# Patient Record
Sex: FEMALE | Race: WHITE | NOT HISPANIC OR LATINO | Employment: FULL TIME | URBAN - METROPOLITAN AREA
[De-identification: names, ages, dates, MRNs, and addresses within clinical notes are randomized per-mention and may not be internally consistent; named-entity substitution may affect disease eponyms.]

---

## 2021-01-06 ENCOUNTER — TELEPHONE (OUTPATIENT)
Dept: SCHEDULING | Facility: IMAGING CENTER | Age: 36
End: 2021-01-06

## 2021-02-03 ENCOUNTER — PATIENT OUTREACH (OUTPATIENT)
Dept: MEDICAL GROUP | Facility: MEDICAL CENTER | Age: 36
End: 2021-02-03

## 2021-02-03 RX ORDER — WARFARIN SODIUM 5 MG/1
5 TABLET ORAL DAILY
COMMUNITY
End: 2021-03-15

## 2021-02-03 NOTE — PROGRESS NOTES
NEW PATIENT VISIT PRE-VISIT PLANNING    1.  EpicCare Patient is checked in Patient Demographics?Yes    2.  Immunizations were updated in Epic using Reconcile Outside Information activity? Yes         3.  Is this appointment scheduled as a Hospital Follow-Up? No    4.  Patient is due for the following Health Maintenance Topics:   Health Maintenance Due   Topic Date Due   • IMM DTaP/Tdap/Td Vaccine (1 - Tdap) 03/31/2004   • PAP SMEAR  03/31/2006   • IMM INFLUENZA (1) 09/01/2020       5.  Reviewed/Updated the following with patient:       •   Preferred Pharmacy? Yes       •   Preferred Lab? Yes       •   Preferred Communication? Yes       •   Allergies? Yes       •   Medications? YES. Was Abstract Encounter opened and chart updated? YES       •   Social History? Yes       •   Family History (document living status of immediate family members and if + hx of  cancer, diabetes, hypertension, hyperlipidemia, heart attack, stroke) No    6.  Updated Care Team?       •   DME Company (gait device, O2, CPAP, etc.) N\A       •   Other Specialists (eye doctor, derm, GYN, cardiology, endo, etc): N\A    7.  AHA (Puls[employer]) form printed for Provider? N/A

## 2021-02-10 ENCOUNTER — TELEMEDICINE (OUTPATIENT)
Dept: MEDICAL GROUP | Facility: MEDICAL CENTER | Age: 36
End: 2021-02-10
Payer: COMMERCIAL

## 2021-02-10 VITALS — HEIGHT: 66 IN | WEIGHT: 200 LBS | BODY MASS INDEX: 32.14 KG/M2

## 2021-02-10 DIAGNOSIS — Z86.718 HISTORY OF DVT (DEEP VEIN THROMBOSIS): ICD-10-CM

## 2021-02-10 DIAGNOSIS — D68.51 HOMOZYGOUS FACTOR V LEIDEN MUTATION (HCC): ICD-10-CM

## 2021-02-10 DIAGNOSIS — G43.109 MIGRAINE WITH AURA AND WITHOUT STATUS MIGRAINOSUS, NOT INTRACTABLE: ICD-10-CM

## 2021-02-10 DIAGNOSIS — E66.9 OBESITY (BMI 30-39.9): ICD-10-CM

## 2021-02-10 DIAGNOSIS — R11.14 BILIOUS VOMITING WITH NAUSEA: ICD-10-CM

## 2021-02-10 DIAGNOSIS — Z79.01 WARFARIN ANTICOAGULATION: ICD-10-CM

## 2021-02-10 PROBLEM — D68.2 FACTOR V DEFICIENCY (HCC): Status: RESOLVED | Noted: 2021-02-10 | Resolved: 2021-02-10

## 2021-02-10 PROBLEM — D68.2 FACTOR V DEFICIENCY (HCC): Status: ACTIVE | Noted: 2021-02-10

## 2021-02-10 PROCEDURE — 99204 OFFICE O/P NEW MOD 45 MIN: CPT | Performed by: NURSE PRACTITIONER

## 2021-02-10 RX ORDER — COPPER 313.4 MG/1
INTRAUTERINE DEVICE INTRAUTERINE
COMMUNITY
End: 2021-02-10

## 2021-02-10 RX ORDER — SUMATRIPTAN 50 MG/1
50 TABLET, FILM COATED ORAL
Qty: 10 TABLET | Refills: 3 | Status: SHIPPED | OUTPATIENT
Start: 2021-02-10 | End: 2021-04-19 | Stop reason: SDUPTHER

## 2021-02-10 RX ORDER — COPPER 313.4 MG/1
INTRAUTERINE DEVICE INTRAUTERINE
COMMUNITY
Start: 2015-07-14 | End: 2025-07-15

## 2021-02-10 RX ORDER — ONDANSETRON 4 MG/1
4 TABLET, FILM COATED ORAL EVERY 4 HOURS PRN
Qty: 15 TABLET | Refills: 3 | Status: SHIPPED
Start: 2021-02-10 | End: 2022-01-18

## 2021-02-10 ASSESSMENT — PATIENT HEALTH QUESTIONNAIRE - PHQ9: CLINICAL INTERPRETATION OF PHQ2 SCORE: 0

## 2021-02-10 NOTE — ASSESSMENT & PLAN NOTE
Diagnosed . DVT x 2. Now on lifelong warfarin.     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    Second DVT LLE in  after  despite being on full dose lovenox during and after pregnancy. DVT was extensive, extending into IVC and iliac veins. 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 Contributing factors thought to be C section, lower dose Lovenox after Csection, and hyperemesis during pregnancy requiring PICC line and frequent IV rehydration.     She has been stable on Warfarin 5 mg daily. Not yet established with anticoagulation clinic.     Copper IUD in place for contraception.

## 2021-02-10 NOTE — ASSESSMENT & PLAN NOTE
Chronic. Ongoing for years. Getting migraines about twice per month. Not taking anything for this as oral medication make her nauseated. She does get nausea and vomiting with this every few migraines. She would like to try something prescription strength for this.     Symptoms include center vision becoming fuzzy/blurry about 30-45 mins prior to headache. Also nausea, photophobia, phonophobia. Migraines usually last a few hours up to an entire day.     Usually occurs about the week prior to period.

## 2021-02-10 NOTE — PROGRESS NOTES
Telemedicine Visit: Established Patient     This encounter was conducted via Zoom.   Verbal consent was obtained. Patient's identity was verified.    Subjective:   CC: Establish care and to discuss the following  Luci Mata is a 35 y.o. female presenting for evaluation and management of:    Migraine with aura and without status migrainosus, not intractable  Chronic. Ongoing for years. Getting migraines about twice per month. Not taking anything for this as oral medication make her nauseated. She does get nausea and vomiting with this every few migraines. She would like to try something prescription strength for this.     Symptoms include center vision becoming fuzzy/blurry about 30-45 mins prior to headache. Also nausea, photophobia, phonophobia. Migraines usually last a few hours up to an entire day.     Usually occurs about the week prior to period.     Hx of deep venous thrombosis  Severe, multiple. Currently on warfarin.     Homozygous Factor V Leiden mutation (HCC)  Diagnosed . DVT x 2. Now on lifelong warfarin.     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    Second DVT LLE in  after  despite being on full dose lovenox during and after pregnancy. DVT was extensive, extending into IVC and iliac veins. 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 Contributing factors thought to be C section, lower dose Lovenox after Csection, and hyperemesis during pregnancy requiring PICC line and frequent IV rehydration.     She has been stable on Warfarin 5 mg daily. Not yet established with anticoagulation clinic.     Copper IUD in place for contraception.          ROS   Denies any recent fevers or chills. No nausea or vomiting. No chest pains or shortness of breath.     No Known Allergies    Current medicines (including changes today)  Current Outpatient Medications   Medication Sig Dispense Refill   •  "Paragard Intrauterine Copper IUD by Intrauterine route.     • SUMAtriptan (IMITREX) 50 MG Tab Take 1 tablet by mouth one time as needed for Migraine for up to 1 dose. 10 tablet 3   • warfarin (COUMADIN) 5 MG Tab Take 5 mg by mouth every day. Pt. Takes half a pill a day       No current facility-administered medications for this visit.       Patient Active Problem List    Diagnosis Date Noted   • Migraine with aura and without status migrainosus, not intractable 02/10/2021   • Warfarin anticoagulation 02/10/2021   • Presence of IUD 07/14/2015   • Hx of deep venous thrombosis 05/17/2013   • Vitamin D deficiency 09/22/2011   • Homozygous Factor V Leiden mutation (HCC) 03/08/2009       No family history on file.    She  has no past medical history on file.  She  has no past surgical history on file.       Objective:   Ht 1.676 m (5' 6\")   Wt 90.7 kg (200 lb)   BMI 32.28 kg/m²     Physical Exam:  Constitutional: Alert, no distress, well-groomed.  Skin: No rashes in visible areas.  Eye: Round. Conjunctiva clear, lids normal. No icterus.   ENMT: Lips pink without lesions, good dentition, moist mucous membranes. Phonation normal.  Neck: No masses, no thyromegaly. Moves freely without pain.  CV: Pulse as reported by patient  Respiratory: Unlabored respiratory effort, no cough or audible wheeze  Psych: Alert and oriented x3, normal affect and mood.       Assessment and Plan:   The following treatment plan was discussed:     1. Migraine with aura and without status migrainosus, not intractable  Unstable  Trial sumatriptan 50 mg as needed  Zofran PRN nausea/vomiting  - SUMAtriptan (IMITREX) 50 MG Tab; Take 1 tablet by mouth one time as needed for Migraine for up to 1 dose.  Dispense: 10 tablet; Refill: 3  - ondansetron (ZOFRAN) 4 MG Tab tablet; Take 1 tablet by mouth every four hours as needed for Nausea/Vomiting.  Dispense: 15 tablet; Refill: 3    2. Homozygous Factor V Leiden mutation (HCC)  Stable  Continue warfarin " daily  Establish with anticoagulation clinic  - REFERRAL TO VASCULAR MEDICINE    3. History of DVT (deep vein thrombosis)  - REFERRAL TO VASCULAR MEDICINE    4. Warfarin anticoagulation  - REFERRAL TO VASCULAR MEDICINE    5. Bilious vomiting with nausea  - ondansetron (ZOFRAN) 4 MG Tab tablet; Take 1 tablet by mouth every four hours as needed for Nausea/Vomiting.  Dispense: 15 tablet; Refill: 3    Other orders  - Paragard Intrauterine Copper IUD; by Intrauterine route.        Follow-up: Return in about 4 weeks (around 3/10/2021) for migraines.

## 2021-02-23 ENCOUNTER — TELEPHONE (OUTPATIENT)
Dept: VASCULAR LAB | Facility: MEDICAL CENTER | Age: 36
End: 2021-02-23

## 2021-03-15 ENCOUNTER — TELEPHONE (OUTPATIENT)
Dept: VASCULAR LAB | Facility: MEDICAL CENTER | Age: 36
End: 2021-03-15

## 2021-03-15 ENCOUNTER — ANTICOAGULATION VISIT (OUTPATIENT)
Dept: MEDICAL GROUP | Facility: MEDICAL CENTER | Age: 36
End: 2021-03-15
Payer: COMMERCIAL

## 2021-03-15 DIAGNOSIS — Z86.718 HX OF DEEP VENOUS THROMBOSIS: ICD-10-CM

## 2021-03-15 DIAGNOSIS — D68.51 HOMOZYGOUS FACTOR V LEIDEN MUTATION (HCC): ICD-10-CM

## 2021-03-15 DIAGNOSIS — Z79.01 WARFARIN ANTICOAGULATION: ICD-10-CM

## 2021-03-15 LAB — INR PPP: 1.2 (ref 2–3.5)

## 2021-03-15 PROCEDURE — 99211 OFF/OP EST MAY X REQ PHY/QHP: CPT | Performed by: INTERNAL MEDICINE

## 2021-03-15 PROCEDURE — 85610 PROTHROMBIN TIME: CPT | Performed by: INTERNAL MEDICINE

## 2021-03-15 RX ORDER — WARFARIN SODIUM 5 MG/1
2.5-5 TABLET ORAL DAILY
Qty: 90 TABLET | Refills: 1 | Status: SHIPPED
Start: 2021-03-15 | End: 2022-01-20

## 2021-03-15 NOTE — PROGRESS NOTES
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 She was previously managed by [facility] in [place] ([phone]). She states her INR goal is 3.0-4.0 because she developed a DVT when her INR was 2.0. However, per external data on her chart, [facility] lists her INR goal to be 2.0-3.0. Pt is unable to recall who told her what her INR goal is. After this visit, I called the Anticoagulation Clinic at [facility] and the pharmacist confirmed her goal was 2.0-3.0.     CHADSVASC = n/a  HAS-BLED = 0  DOAC = will discuss with pt - however pt admits to forgetting to take her pills, which may be problematic if she was switched to a DOAC    Target end date = Indefinite    Pt is new to warfarin and new to [facility]. Discussed:   · Indication for warfarin therapy and INR goal range.   · Importance of monitoring and compliance.   · Monitoring parameters, signs and symptoms of bleeding or clotting.  · Warfarin therapy, side effects, potential DDIs, OTC medications  · Hormonal therapy  none- has a copper IUD  · Pregnancy - copper IUD  · Antiplatelet none  · DDI n/a  · Importance of diet consistency, ie vitamin K intake, supplements  · Lifestyle safety, ie smoking, ETOH, hobby safety, fall safety/prevention  · Procedures for missed doses or suspected missed doses, surgeries/procedures, travel, dental work, any medication changes    Pt denies any unusual s/s of bleeding, bruising, clotting or any changes to diet or medications.    INR is subtherapeutic today.   Will continue to titrate pt's warfarin dose. Pt's been taking 5 mg on Monday, 2.5 mg AOD     Will have pt bolus with 10 mg tonight and tomorrow, then continue with regimen. Pt to start bridging with Lovenox    Recheck INR in 3 days.    Riana Berkowitz, PharmD    Added [facility] to care team   Send to [facility] to submit HM application

## 2021-03-15 NOTE — Clinical Note
"Arnold Jones, I saw this pt today during her anticoagulation appt and she was unable to  her sumatriptan because the pharmacy had an issue with the sig since it says for \"one dose\"."

## 2021-03-16 NOTE — TELEPHONE ENCOUNTER
Initial anticoagulation clinic note and PCP note reviewed  Pending further recommendations from PCP, we will continue with indefinite anticoagulation for history of VTE with reported homozygous factor V Leiden as recommended by PCP    Michael J. Bloch, MD  Anticoagulation Center    CC: Karen albright

## 2021-03-18 ENCOUNTER — TELEPHONE (OUTPATIENT)
Dept: VASCULAR LAB | Facility: MEDICAL CENTER | Age: 36
End: 2021-03-18

## 2021-03-18 ENCOUNTER — ANTICOAGULATION VISIT (OUTPATIENT)
Dept: MEDICAL GROUP | Facility: MEDICAL CENTER | Age: 36
End: 2021-03-18
Payer: COMMERCIAL

## 2021-03-18 DIAGNOSIS — Z86.718 HX OF DEEP VENOUS THROMBOSIS: ICD-10-CM

## 2021-03-18 DIAGNOSIS — Z79.01 WARFARIN ANTICOAGULATION: ICD-10-CM

## 2021-03-18 DIAGNOSIS — D68.51 HOMOZYGOUS FACTOR V LEIDEN MUTATION (HCC): ICD-10-CM

## 2021-03-18 LAB — INR PPP: 4.3 (ref 2–3.5)

## 2021-03-18 PROCEDURE — 85610 PROTHROMBIN TIME: CPT | Performed by: INTERNAL MEDICINE

## 2021-03-18 PROCEDURE — 99211 OFF/OP EST MAY X REQ PHY/QHP: CPT | Performed by: INTERNAL MEDICINE

## 2021-03-18 NOTE — PROGRESS NOTES
OP Anticoagulation Service Note    Date: 3/18/2021  There were no vitals filed for this visit.   pt declined vitals    Anticoagulation Summary  As of 3/18/2021    INR goal:  2.0-3.0   TTR:  --   INR used for dosin.30 (3/18/2021)   Warfarin maintenance plan:  5 mg (5 mg x 1) every Mon; 2.5 mg (5 mg x 0.5) all other days   Weekly warfarin total:  20 mg   Plan last modified:  Riana Berkowitz, PharmD (3/15/2021)   Next INR check:  2021   Target end date:  Indefinite    Indications    Homozygous Factor V Leiden mutation (HCC) [D68.51]  Hx of deep venous thrombosis [Z86.718]  Factor V deficiency (HCC) (Resolved) [D68.2]  Warfarin anticoagulation [Z79.01]             Anticoagulation Episode Summary     INR check location:      Preferred lab:      Send INR reminders to:      Comments:        Anticoagulation Care Providers     Provider Role Specialty Phone number    [facility] Referring  [phone]        Anticoagulation Patient Findings  Patient Findings     Negatives:  Signs/symptoms of thrombosis, Signs/symptoms of bleeding, Laboratory test error suspected, Change in health, Change in alcohol use, Change in activity, Upcoming invasive procedure, Emergency department visit, Upcoming dental procedure, Missed doses, Extra doses, Change in medications, Change in diet/appetite, Hospital admission, Bruising, Other complaints          HPI:   Luci Mata seen in clinic today, on anticoagulation therapy with warfarin (a high risk medication) for hx of DVT, homozygous factor V leiden mutation    Pt is here today to evaluate anticoagulation therapy    Previous INR was  1.2 on 3/15/2021    Pt was instructed to bolus x 2 days, then continue regimen and begin bridging with Lovenox    Confirmed warfarin dosing regimen, denies missed or extra doses of coumadin.   Diet has been consistent with foods rich in vitamin K: Yes  Changes in ETOH:  No  Changes in smoking status: No  Changes in medication:  No   Cost restriction: No  S/s of bleeding:  No  Falls or accidents since last visit No  Signs/symptoms  thrombosis since the last appt: No      A/P   INR  supra-therapeutic today, will require dose adjust ment today to prevent bleeding complications and closer follow up.     Hold x 1 day then continue regimen    03/22 check referral    Pt educated to contact our clinic with any changes in medications or s/s of bleeding or thrombosis. Pt is aware to seek immediate medical attention for falls, head injury or deep cuts    Follow up appointment in 2 week(s) at [facility] as pt will be out of town all next week  Riana Berkowitz, RamónD

## 2021-03-22 ENCOUNTER — TELEPHONE (OUTPATIENT)
Dept: VASCULAR LAB | Facility: MEDICAL CENTER | Age: 36
End: 2021-03-22

## 2021-04-01 ENCOUNTER — ANTICOAGULATION VISIT (OUTPATIENT)
Dept: MEDICAL GROUP | Facility: PHYSICIAN GROUP | Age: 36
End: 2021-04-01
Payer: COMMERCIAL

## 2021-04-01 DIAGNOSIS — D68.51 HOMOZYGOUS FACTOR V LEIDEN MUTATION (HCC): ICD-10-CM

## 2021-04-01 DIAGNOSIS — Z86.718 HX OF DEEP VENOUS THROMBOSIS: ICD-10-CM

## 2021-04-01 DIAGNOSIS — Z79.01 WARFARIN ANTICOAGULATION: ICD-10-CM

## 2021-04-01 LAB — INR PPP: 2.8 (ref 2–3.5)

## 2021-04-01 PROCEDURE — 93793 ANTICOAG MGMT PT WARFARIN: CPT | Performed by: NURSE PRACTITIONER

## 2021-04-01 PROCEDURE — 85610 PROTHROMBIN TIME: CPT | Performed by: NURSE PRACTITIONER

## 2021-04-01 NOTE — PROGRESS NOTES
OP Anticoagulation Service Note    Date: 2021    Anticoagulation Summary  As of 2021    INR goal:  2.0-3.0   TTR:  24.4 % (1 wk)   INR used for dosin.80 (2021)   Warfarin maintenance plan:  5 mg (5 mg x 1) every Mon; 2.5 mg (5 mg x 0.5) all other days   Weekly warfarin total:  20 mg   Plan last modified:  Riana Berkowitz, PharmD (3/15/2021)   Next INR check:  2021   Target end date:  Indefinite    Indications    Homozygous Factor V Leiden mutation (HCC) [D68.51]  Hx of deep venous thrombosis [Z86.718]  Factor V deficiency (HCC) (Resolved) [D68.2]  Warfarin anticoagulation [Z79.01]             Anticoagulation Episode Summary     INR check location:      Preferred lab:      Send INR reminders to:      Comments:        Anticoagulation Care Providers     Provider Role Specialty Phone number    [facility] Referring  [phone]        Anticoagulation Patient Findings      HPI:     Luci Mata is in the Anticoagulation Clinic today for a INR check on their anticoagulation therapy.     The reason for today's visit is to prevent morbidity and mortality from a blood clot and/or stroke and to reduce the risk of bleeding while on a anticoagulant.     PCP:  Karen Fajardo, GIOVANNIPDandreR.N.  [address]  [facility] [id number] [phone]    3 vitals included with today's appt-unless patient declined:  (BP, HR, weight, ht, RR)   There were no vitals filed for this visit.    Assessment:     • INR  therapeutic.   • Confirmed warfarin dosing regimen: Yes  • Interval Changes with foods rich in vitamin K: No  • Interval Changes in ETOH or cranberries:   No  • Interval Changes in smoking status:  No  • S/S of bleeding or bruising:  No  • Signs/symptoms  thrombosis since the last appt:  No  • Any upcoming procedures that require stopping warfarin and/or using bridge therapy: None  • Interval Changes in medication:  No   • Is pt on antiplatelet therapy: no.  • Is pt on NSAID:  no      Current Outpatient Medications:   •  warfarin, 2.5-5 mg, Oral, DAILY  •  Paragard Intrauterine Copper, by Intrauterine route.  •  SUMAtriptan, 50 mg, Oral, Once PRN  •  ondansetron, 4 mg, Oral, Q4HRS PRN      Plan:     Continue the same warfarin dose, as noted above.     Follow-up:     • Our protocol suggests we test in 4 weeks.    • This decision was made using shared decision making with the pt and benefits vs risks were discussed to reduce Covid exposure.        Additional information discussed with patient:     • Pt educated to contact our clinic with any changes in medications or s/s of bleeding or thrombosis.  • Education was provided today regarding tips to reduce their bleed risk and dietary constraints while on a anticoagulant.    National recommendations regarding anticoagulation therapy:     The CHEST guidelines recommends frequent INR monitoring at regular intervals (a few days up to a max of 12 weeks) to ensure patients are on the proper dose of warfarin, and patients are not having any complications from therapy.  INRs can dramatically change over a short time period due to diet, medications, and medical conditions.       Walter Roberson, PharmD, MS, BCACP, LCC  [facility]  Phone [phone] fax [phone]    This note was created using voice recognition software (Dragon). The accuracy of the dictation is limited by the abilities of the software. I have reviewed the note prior to signing, however some errors in grammar and context are still possible. If you have any questions related to this note please do not hesitate to contact our office.

## 2021-04-19 ENCOUNTER — PATIENT MESSAGE (OUTPATIENT)
Dept: MEDICAL GROUP | Facility: MEDICAL CENTER | Age: 36
End: 2021-04-19

## 2021-04-19 DIAGNOSIS — G43.109 MIGRAINE WITH AURA AND WITHOUT STATUS MIGRAINOSUS, NOT INTRACTABLE: ICD-10-CM

## 2021-04-19 RX ORDER — SUMATRIPTAN 50 MG/1
50 TABLET, FILM COATED ORAL
Qty: 10 TABLET | Refills: 3 | Status: SHIPPED | OUTPATIENT
Start: 2021-04-19 | End: 2021-04-21 | Stop reason: SDUPTHER

## 2021-04-21 RX ORDER — SUMATRIPTAN 50 MG/1
50 TABLET, FILM COATED ORAL
Qty: 10 TABLET | Refills: 3 | Status: SHIPPED
Start: 2021-04-21 | End: 2022-01-18

## 2021-04-21 NOTE — TELEPHONE ENCOUNTER
1. Caller Name: [facility]  Call Back Number: [phone]    Pharmacy sent fax requesting max daily dose of medication. Called pharmacy and provided max daily dose: 100 MG per [place].

## 2021-04-25 LAB — INR PPP: 1.1 (ref 2–3.5)

## 2021-04-26 ENCOUNTER — ANTICOAGULATION MONITORING (OUTPATIENT)
Dept: VASCULAR LAB | Facility: MEDICAL CENTER | Age: 36
End: 2021-04-26

## 2021-04-26 DIAGNOSIS — Z79.01 WARFARIN ANTICOAGULATION: ICD-10-CM

## 2021-04-26 DIAGNOSIS — D68.51 HOMOZYGOUS FACTOR V LEIDEN MUTATION (HCC): ICD-10-CM

## 2021-04-26 DIAGNOSIS — Z86.718 HX OF DEEP VENOUS THROMBOSIS: ICD-10-CM

## 2021-04-26 NOTE — PROGRESS NOTES
I attempted to call this patient to report their INR. Unfortunately, I was not able to speak with them or leave a voice message; therefore, we will need to call back at a later time.    INR 1.1

## 2021-04-27 NOTE — PROGRESS NOTES
Anticoagulation Summary  As of 2021    INR goal:  2.0-3.0   TTR:  41.5 % (1 mo)   INR used for dosin.10 (2021)   Warfarin maintenance plan:  5 mg (5 mg x 1) every Mon; 2.5 mg (5 mg x 0.5) all other days   Weekly warfarin total:  20 mg   Plan last modified:  Riana Berkowitz PharmD (3/15/2021)   Next INR check:  2021   Target end date:  Indefinite    Indications    Homozygous Factor V Leiden mutation (HCC) [D68.51]  Hx of deep venous thrombosis [Z86.718]  Factor V deficiency (HCC) (Resolved) [D68.2]  Warfarin anticoagulation [Z79.01]             Anticoagulation Episode Summary     INR check location:      Preferred lab:      Send INR reminders to:      Comments:        Anticoagulation Care Providers     Provider Role Specialty Phone number    [facility] Referring  [phone]        Anticoagulation Patient Findings      Spoke with pt.  INR is subtherapeutic.   Pt denies any unusual s/s of bleeding, bruising, clotting or any changes to diet or medications. Denies any etoh, cranberries, supplements, or illness.   Pt verifies warfarin weekly dosing. Pt states she missed doses.    Will have pt continue regimen. Pt states she took 5 mg bolus x 1 day.    Repeat INR in 1 week(s).     Riana Berkowitz, PharmD

## 2021-05-05 ENCOUNTER — TELEPHONE (OUTPATIENT)
Dept: VASCULAR LAB | Facility: MEDICAL CENTER | Age: 36
End: 2021-05-05

## 2021-05-05 NOTE — TELEPHONE ENCOUNTER
S/w to remind of need for INR check.  She states she will test when she get back home tomorrow, 5/6/21.  Brando Pruitt, PharmD, BCACP

## 2021-05-10 ENCOUNTER — TELEPHONE (OUTPATIENT)
Dept: VASCULAR LAB | Facility: MEDICAL CENTER | Age: 36
End: 2021-05-10

## 2021-05-12 ENCOUNTER — ANTICOAGULATION MONITORING (OUTPATIENT)
Dept: VASCULAR LAB | Facility: MEDICAL CENTER | Age: 36
End: 2021-05-12

## 2021-05-12 DIAGNOSIS — Z79.01 WARFARIN ANTICOAGULATION: ICD-10-CM

## 2021-05-12 DIAGNOSIS — D68.51 HOMOZYGOUS FACTOR V LEIDEN MUTATION (HCC): ICD-10-CM

## 2021-05-12 DIAGNOSIS — Z86.718 HX OF DEEP VENOUS THROMBOSIS: ICD-10-CM

## 2021-05-12 LAB — INR PPP: 1.9 (ref 2–3.5)

## 2021-05-12 NOTE — Clinical Note
Arnold Hassan! [facility] is asking pt to test every week (4x/month) but we only need her to test every 2 weeks (2x/month). Can you change that on her application and have one of the providers sign the change in testing frequency?   EARL Mayers

## 2021-05-12 NOTE — PROGRESS NOTES
"Anticoagulation Summary  As of 2021    INR goal:  2.0-3.0   TTR:  26.8 % (1.6 mo)   INR used for dosin.90 (2021)   Warfarin maintenance plan:  5 mg (5 mg x 1) every Mon; 2.5 mg (5 mg x 0.5) all other days   Weekly warfarin total:  20 mg   Plan last modified:  Riana Berkowitz PharmD (3/15/2021)   Next INR check:  2021   Target end date:  Indefinite    Indications    Homozygous Factor V Leiden mutation (HCC) [D68.51]  Hx of deep venous thrombosis [Z86.718]  Factor V deficiency (HCC) (Resolved) [D68.2]  Warfarin anticoagulation [Z79.01]             Anticoagulation Episode Summary     INR check location:      Preferred lab:      Send INR reminders to:      Comments:  [facility] home meter      Anticoagulation Care Providers     Provider Role Specialty Phone number    [facility] Referring  [phone]        Anticoagulation Patient Findings      Spoke with pt.  INR is slightly subtherapeutic.   Pt denies any unusual s/s of bleeding, bruising, clotting or any changes to diet or medications. Denies any etoh, cranberries, supplements, or illness.   Pt verifies warfarin weekly dosing. Pt missed a dose.    Will have pt bolus x 1 day then continue regimen    Repeat INR in 2 week(s).   Of note pt informed me that [facility] wanted her to test INR every week when pt only needs to test every 2 weeks. Will ask Sonya to change testing frequency to 2x/month.    Riana Berkowitz, Adrienne    ADDENDUM : Per Sonya: All patients with [employer] [employer] require the home monitor application to say \"weekly\" test frequency or else the insurance company won't cover the monitor expenses. Pt will need to follow up with insurance. [facility] won't accept a 2-4 times a month test frequency per Aleksandar.\" - LVM for pt regarding update.      "

## 2021-05-28 LAB — INR PPP: 2.8 (ref 2–3.5)

## 2021-06-01 ENCOUNTER — ANTICOAGULATION MONITORING (OUTPATIENT)
Dept: VASCULAR LAB | Facility: MEDICAL CENTER | Age: 36
End: 2021-06-01

## 2021-06-01 DIAGNOSIS — Z79.01 WARFARIN ANTICOAGULATION: ICD-10-CM

## 2021-06-01 DIAGNOSIS — Z86.718 HX OF DEEP VENOUS THROMBOSIS: ICD-10-CM

## 2021-06-01 DIAGNOSIS — D68.51 HOMOZYGOUS FACTOR V LEIDEN MUTATION (HCC): ICD-10-CM

## 2021-06-01 NOTE — PROGRESS NOTES
Anticoagulation Summary  As of 2021    INR goal:  2.0-3.0   TTR:  42.3 % (2.1 mo)   INR used for dosin.80 (2021)   Warfarin maintenance plan:  5 mg (5 mg x 1) every Mon; 2.5 mg (5 mg x 0.5) all other days   Weekly warfarin total:  20 mg   Plan last modified:  Riana Berkowitz, PharmD (3/15/2021)   Next INR check:     Target end date:  Indefinite    Indications    Homozygous Factor V Leiden mutation (HCC) [D68.51]  Hx of deep venous thrombosis [Z86.718]  Factor V deficiency (HCC) (Resolved) [D68.2]  Warfarin anticoagulation [Z79.01]             Anticoagulation Episode Summary     INR check location:      Preferred lab:      Send INR reminders to:      Comments:  [employer] home meter - needs to test weekly per insurance      Anticoagulation Care Providers     Provider Role Specialty Phone number    [facility] Referring  [phone]        Anticoagulation Patient Findings       Left voicemail message to report a  therapeutic INR of 2.8.  Pt to continue with current warfarin dosing regimen. Requested pt contact the clinic for any s/s of unusual bleeding, bruising, clotting or any changes to diet or medication.  Follow up in 1 weeks, to reduce the risk of adverse events related to this high risk medication, warfarin.    Nan Lizarraga, Clinical Pharmacist

## 2021-06-30 ENCOUNTER — TELEPHONE (OUTPATIENT)
Dept: VASCULAR LAB | Facility: MEDICAL CENTER | Age: 36
End: 2021-06-30

## 2021-08-17 ENCOUNTER — NON-PROVIDER VISIT (OUTPATIENT)
Dept: MEDICAL GROUP | Facility: PHYSICIAN GROUP | Age: 36
End: 2021-08-17

## 2021-08-17 DIAGNOSIS — Z11.1 ENCOUNTER FOR PPD TEST: ICD-10-CM

## 2021-08-17 PROCEDURE — 86580 TB INTRADERMAL TEST: CPT | Performed by: NURSE PRACTITIONER

## 2021-08-17 NOTE — PROGRESS NOTES
Luci Mata is a 36 y.o. female here for a non-provider visit for PPD placement -- Step 1 of 1    Reason for PPD:  work requirement    1. TB evaluation questionnaire completed by patient? Yes      -  If any answers marked yes did you contact a provider prior to placing? Not Indicated  2.  Patient notified to return to clinic for reading on: 08/19 after or 08/20 before   3.  PPD Placement documentation completed on TB evaluation questionnaire? Yes  4.  Location of TB evaluation questionnaire filed: [facility]

## 2021-08-18 ENCOUNTER — TELEMEDICINE (OUTPATIENT)
Dept: MEDICAL GROUP | Facility: MEDICAL CENTER | Age: 36
End: 2021-08-18
Payer: COMMERCIAL

## 2021-08-18 VITALS — WEIGHT: 200 LBS | HEIGHT: 66 IN | BODY MASS INDEX: 32.14 KG/M2

## 2021-08-18 DIAGNOSIS — R20.0 THIGH NUMBNESS: ICD-10-CM

## 2021-08-18 DIAGNOSIS — M54.32 SCIATICA OF LEFT SIDE: ICD-10-CM

## 2021-08-18 PROCEDURE — 99213 OFFICE O/P EST LOW 20 MIN: CPT | Mod: 95 | Performed by: NURSE PRACTITIONER

## 2021-08-18 NOTE — PROGRESS NOTES
Telemedicine Visit: Established Patient     This encounter was conducted via Zoom.   Verbal consent was obtained. Patient's identity was verified.    Subjective:   CC: thigh numbness  Luci Mata is a 36 y.o. female presenting for evaluation and management of:    Thigh numbness  Ongoing for 10+ years since the birth of her last child. Has been worsening lately with running regimen. Starts at knee and extends up 2/3 length of thigh, about 6 inches in diameter.     Described as numbness all the time, but when she is running she experiences painful burning and occasional tingling.     Does have some low back pain, reports slipping a disc about 4 years. Did PT for this for about 4 months. Also was taking anti-inflammatories for this.     Burning pain will last about 10 minutes. Numbness never goes away.        ROS   Positive ROS as per HPI. All other systems reviewed and are negative.    No Known Allergies    Current medicines (including changes today)  Current Outpatient Medications   Medication Sig Dispense Refill   • ondansetron (ZOFRAN) 4 MG Tab tablet Take 1 tablet by mouth every four hours as needed for Nausea/Vomiting. 15 tablet 3   • SUMAtriptan (IMITREX) 50 MG Tab Take 1 tablet by mouth 1 time a day as needed for Migraine (May repeat dose ONCE after 2 hours if migraine persists). 10 tablet 3   • warfarin (COUMADIN) 5 MG Tab Take 0.5-1 Tablets by mouth every day. As directed by [facility] 90 tablet 1   • Paragard Intrauterine Copper IUD by Intrauterine route.       No current facility-administered medications for this visit.       Patient Active Problem List    Diagnosis Date Noted   • Thigh numbness 08/18/2021   • Migraine with aura and without status migrainosus, not intractable 02/10/2021   • Warfarin anticoagulation 02/10/2021   • Obesity (BMI 30-39.9) 02/10/2021   • Presence of IUD 07/14/2015   • Hx of deep venous thrombosis 05/17/2013   • Vitamin D deficiency 09/22/2011   •  "Homozygous Factor V Leiden mutation (HCC) 03/08/2009       History reviewed. No pertinent family history.    She  has no past medical history on file.  She  has no past surgical history on file.       Objective:   Ht 1.676 m (5' 6\")   Wt 90.7 kg (200 lb)   LMP 08/17/2021 (Exact Date)   BMI 32.28 kg/m²     Physical Exam:  Constitutional: Alert, no distress, well-groomed.  Skin: No rashes in visible areas.  Eye: Round. Conjunctiva clear, lids normal. No icterus.   ENMT: Lips pink without lesions, good dentition, moist mucous membranes. Phonation normal.  Neck: No masses, no thyromegaly. Moves freely without pain.  CV: Pulse as reported by patient  Respiratory: Unlabored respiratory effort, no cough or audible wheeze  Psych: Alert and oriented x3, normal affect and mood.       Assessment and Plan:   The following treatment plan was discussed:     1. Thigh numbness  Unstable  Check lumbar Xray, last done 2013  Likely sciatica after pregnancy being exacerbated by running, advised no running, switch to brisk walk and cycling to decrease low back impact.   No Cauda Equina symptoms  Follow up with physiatry to discuss symptoms, consider EMG due to chronic numbness,   - DX-LUMBAR SPINE-2 OR 3 VIEWS; Future  - REFERRAL TO OUTPATIENT INTERVENTIONAL PAIN CLINIC    2. Sciatica of left side  - DX-LUMBAR SPINE-2 OR 3 VIEWS; Future  - REFERRAL TO OUTPATIENT INTERVENTIONAL PAIN CLINIC        Follow-up: Return if symptoms worsen or fail to improve.            "

## 2021-08-18 NOTE — ASSESSMENT & PLAN NOTE
Ongoing for 10+ years since the birth of her last child. Has been worsening lately with running regimen. Starts at knee and extends up 2/3 length of thigh, about 6 inches in diameter.     Described as numbness all the time, but when she is running she experiences painful burning and occasional tingling.     Does have some low back pain, reports slipping a disc about 4 years. Did PT for this for about 4 months. Also was taking anti-inflammatories for this.     Burning pain will last about 10 minutes. Numbness never goes away.

## 2021-08-19 ENCOUNTER — NON-PROVIDER VISIT (OUTPATIENT)
Dept: MEDICAL GROUP | Facility: PHYSICIAN GROUP | Age: 36
End: 2021-08-19
Payer: COMMERCIAL

## 2021-08-19 LAB — TB WHEAL 3D P 5 TU DIAM: 0 MM

## 2021-08-20 NOTE — PROGRESS NOTES
Luci Mata is a 36 y.o. female here for a non-provider visit for PPD reading -- Step 1 of 1.      1.  Resulted in Epic under enter/edit results? Yes   2.  TB evaluation questionnaire scanned into chart and original given to patient?Yes      3. Was induration greater than 0 mm? No.      Routed to PCP? Yes

## 2021-09-02 ENCOUNTER — TELEPHONE (OUTPATIENT)
Dept: VASCULAR LAB | Facility: MEDICAL CENTER | Age: 36
End: 2021-09-02

## 2021-09-02 NOTE — TELEPHONE ENCOUNTER
Left message for pt to have INR checked  2nd call  Letter sent  Nan Lizarraga, Clinical Pharmacist, CDE, CACP

## 2021-09-02 NOTE — LETTER
Luci Mata  [address]  [place] [address]    09/02/21    Dear Luci Mata ,    We have been unsuccessful in our attempts to contact you regarding your Anticoagulation Service appointments. Warfarin   is a potent blood-thinning agent that requires monitoring to ensure that the dosage is correct for your body.  If it isn't, you could develop serious, sometimes life-threatening bleeding problems or life-threatening blood clots or stroke could result.    To monitor you effectively, we need to be able to communicate with you.  This is a requirement to be followed by our Service.       If you repeatedly fail to keep your lab appointments, you are at risk of being discharged from the Anticoagulation Service.    It is extremely important that you contact the clinic as soon as possible to arrange appropriate follow up.  We are open Monday-Friday 8 am until 5 pm.  You may reach our Service at [phone].           Sincerely,           Sudhir Mcgee PharmD, [facility]  Clinic Supervisor  [facility]  Outpatient Anticoagulation Service

## 2021-10-04 ENCOUNTER — OFFICE VISIT (OUTPATIENT)
Dept: PHYSICAL MEDICINE AND REHAB | Facility: MEDICAL CENTER | Age: 36
End: 2021-10-04
Payer: COMMERCIAL

## 2021-10-04 VITALS
BODY MASS INDEX: 33.59 KG/M2 | SYSTOLIC BLOOD PRESSURE: 108 MMHG | TEMPERATURE: 97.2 F | DIASTOLIC BLOOD PRESSURE: 64 MMHG | HEIGHT: 66 IN | HEART RATE: 60 BPM | OXYGEN SATURATION: 99 % | WEIGHT: 209 LBS

## 2021-10-04 DIAGNOSIS — R20.0 THIGH NUMBNESS: ICD-10-CM

## 2021-10-04 ASSESSMENT — PATIENT HEALTH QUESTIONNAIRE - PHQ9: CLINICAL INTERPRETATION OF PHQ2 SCORE: 0

## 2021-10-04 ASSESSMENT — PAIN SCALES - GENERAL: PAINLEVEL: 9=SEVERE PAIN

## 2021-10-04 NOTE — PROGRESS NOTES
Patient left prior to being seen reportedly for a family urgent matter. She will reschedule.  There will be no charge today.

## 2021-10-05 ENCOUNTER — TELEPHONE (OUTPATIENT)
Dept: VASCULAR LAB | Facility: MEDICAL CENTER | Age: 36
End: 2021-10-05

## 2021-10-05 NOTE — TELEPHONE ENCOUNTER
Left message for pt to have INR checked  3rd call  2nd letter sent  INR   Date Value Ref Range Status   05/28/2021 2.80 2 - 3.5 Final     No results found for: MARC Lizarraga, Clinical Pharmacist, CDE, CACP

## 2021-10-05 NOTE — LETTER
Luci Mata  [address]  [place] [address]    10/05/21    Dear Luci Mata ,    We have been unsuccessful in our attempts to contact you regarding your Anticoagulation Service appointments. Warfarin is a potent blood-thinning agent that requires monitoring to ensure that the dosage is correct for your body.  If it isn't, you could develop serious, sometimes life-threatening bleeding problems or life-threatening blood clots or stroke could result.    To monitor you effectively, we need to be able to communicate with you.  This is a requirement to be followed by our Service.       If you repeatedly fail to keep your lab appointments, you are at risk of being discharged from the Anticoagulation Service.    It is extremely important that you contact the clinic as soon as possible to arrange appropriate follow up.  We are open Monday-Friday 8 am until 5 pm.  You may reach our Service at [phone].           Sincerely,           Sudhir Mcgee PharmD, [facility]  Clinic Supervisor  [facility]  Outpatient Anticoagulation Service

## 2021-11-04 ENCOUNTER — OFFICE VISIT (OUTPATIENT)
Dept: PHYSICAL MEDICINE AND REHAB | Facility: MEDICAL CENTER | Age: 36
End: 2021-11-04
Payer: COMMERCIAL

## 2021-11-04 ENCOUNTER — TELEPHONE (OUTPATIENT)
Dept: VASCULAR LAB | Facility: MEDICAL CENTER | Age: 36
End: 2021-11-04

## 2021-11-04 VITALS
WEIGHT: 208.11 LBS | HEIGHT: 66 IN | OXYGEN SATURATION: 96 % | HEART RATE: 70 BPM | BODY MASS INDEX: 33.45 KG/M2 | SYSTOLIC BLOOD PRESSURE: 110 MMHG | DIASTOLIC BLOOD PRESSURE: 64 MMHG | TEMPERATURE: 97.1 F

## 2021-11-04 DIAGNOSIS — M79.2 NEURALGIA: ICD-10-CM

## 2021-11-04 DIAGNOSIS — G57.12 NEUROPATHY OF LEFT LATERAL FEMORAL CUTANEOUS NERVE: ICD-10-CM

## 2021-11-04 DIAGNOSIS — G57.12 MERALGIA PARESTHETICA OF LEFT SIDE: ICD-10-CM

## 2021-11-04 PROCEDURE — 99203 OFFICE O/P NEW LOW 30 MIN: CPT | Performed by: PHYSICAL MEDICINE & REHABILITATION

## 2021-11-04 ASSESSMENT — PAIN SCALES - GENERAL: PAINLEVEL: NO PAIN

## 2021-11-04 NOTE — PROGRESS NOTES
New patient note    Interventional spine and Pain  Physiatry (physical medicine and  Rehabilitation)     Date of service: See epic    Chief complaint:   Chief Complaint   Patient presents with   • New Patient     Back pain        Referring provider: TONY Anguiano     HISTORY    HPI: Luci Mata 36 y.o.  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    HPI    Starting 2009 with the patient's son was born she started experiencing left lateral side numbness.  At rest there is a constant numbness in the anterolateral thigh not extending past the knee.  There is no pain coming from the back.  There is no radiating pain from the buttocks to the back.  When she tries exercising this numbness turns into a burning type pain.  The burning type pain when she is exercising 8/10.  The patient has tried physical therapy, she has a home exercise program and is tried this including the past 3 months.    Medications tried include Tylenol.  She cannot take NSAIDs because of warfarin.       Medical records review:  I reviewed the note from the referring provider TONY Anguiano  including the note dated 8/18/2021 with thigh numbness concern for sciatica with x-rays of the lumbar spine ordered.  Referred to interventional pain clinic..           ROS:   Red Flags ROS:   Fever, Chills, Sweats: Denies  Involuntary Weight Loss: Denies  Bladder Incontinence: Denies  Bowel Incontinence: denies  Saddle Anesthesia: Denies    All other systems reviewed and negative.       PMHx:   History reviewed. No pertinent past medical history.      Current Outpatient Medications on File Prior to Visit   Medication Sig Dispense Refill   • SUMAtriptan (IMITREX) 50 MG Tab Take 1 tablet by mouth 1 time a day as needed for Migraine (May repeat dose ONCE after 2 hours if migraine persists). 10 tablet 3   •  warfarin (COUMADIN) 5 MG Tab Take 0.5-1 Tablets by mouth every day. As directed by [facility] 90 tablet 1   • Paragard Intrauterine Copper IUD by Intrauterine route.     • ondansetron (ZOFRAN) 4 MG Tab tablet Take 1 tablet by mouth every four hours as needed for Nausea/Vomiting. 15 tablet 3     No current facility-administered medications on file prior to visit.        PSHx:   History reviewed. No pertinent surgical history.    Family history   History reviewed. No pertinent family history.      Medications: reviewed on epic.   Outpatient Medications Marked as Taking for the 11/4/21 encounter (Office Visit) with Rodolfo Luna M.D.   Medication Sig Dispense Refill   • SUMAtriptan (IMITREX) 50 MG Tab Take 1 tablet by mouth 1 time a day as needed for Migraine (May repeat dose ONCE after 2 hours if migraine persists). 10 tablet 3   • warfarin (COUMADIN) 5 MG Tab Take 0.5-1 Tablets by mouth every day. As directed by [facility] 90 tablet 1   • Paragard Intrauterine Copper IUD by Intrauterine route.     • ondansetron (ZOFRAN) 4 MG Tab tablet Take 1 tablet by mouth every four hours as needed for Nausea/Vomiting. 15 tablet 3        Allergies:   No Known Allergies    Social Hx:   Social History     Socioeconomic History   • Marital status:      Spouse name: Not on file   • Number of children: Not on file   • Years of education: Not on file   • Highest education level: Not on file   Occupational History   • Not on file   Tobacco Use   • Smoking status: Never Smoker   • Smokeless tobacco: Never Used   Substance and Sexual Activity   • Alcohol use: Yes   • Drug use: Never   • Sexual activity: Not on file   Other Topics Concern   • Not on file   Social History Narrative   • Not on file     Social Determinants of Health     Financial Resource Strain:    • Difficulty of Paying Living Expenses:    Food Insecurity:    • Worried About Running Out of Food in the Last Year:    • Ran Out of  "Food in the Last Year:    Transportation Needs:    • Lack of Transportation (Medical):    • Lack of Transportation (Non-Medical):    Physical Activity:    • Days of Exercise per Week:    • Minutes of Exercise per Session:    Stress:    • Feeling of Stress :    Social Connections:    • Frequency of Communication with Friends and Family:    • Frequency of Social Gatherings with Friends and Family:    • Attends Mu-ism Services:    • Active Member of Clubs or Organizations:    • Attends Club or Organization Meetings:    • Marital Status:    Intimate Partner Violence:    • Fear of Current or Ex-Partner:    • Emotionally Abused:    • Physically Abused:    • Sexually Abused:          EXAMINATION     Physical Exam:   Vitals: /64 (BP Location: Right arm, Patient Position: Sitting, BP Cuff Size: Adult)   Pulse 70   Temp 36.2 °C (97.1 °F) (Temporal)   Ht 1.676 m (5' 6\")   Wt 94.4 kg (208 lb 1.8 oz)   SpO2 96%     Constitutional:   Body Habitus: Body mass index is 33.59 kg/m².  Cooperation: Fully cooperates with exam  Appearance: Well-groomed, well-nourished, not disheveled     Eyes: No scleral icterus to suggest severe liver disease, no proptosis to suggest severe hyperthyroid    ENT -no obvious auditory deficits, no obvious tongue lesions, tongue midline, no facial droop     Skin -no rashes or lesions noted     Respiratory-  breathing comfortable on room air, no audible wheezing    Cardiovascular- capillary refills less than 2 seconds.     Psychiatric- alert and oriented ×3. Normal affect.     Gait - normal gait, no use of ambulatory device, nonantalgic.     Musculoskeletal and Neuro -     Thoracic/Lumbar Spine/Sacral Spine/Hips   Inspection: No evidence of atrophy in bilateral lower extremities throughout     ROM: full  active range of motion with flexion, lateral flexion, and rotation bilaterally.   There is full  active range of motion with lumbar extension without pain.    There is no pain with facet loading " maneuver (extension rotation) with axial low back pain on the BILATERAL side(s)    Palpation:   No tenderness to palpation in midline at T1-T12 levels. No tenderness to palpation in the left and right of the midline T1-L5, NEGATIVE for tenderness to palpation to the para-midline region in the lower lumbar levels.  palpation over SI joint: negative bilaterally    palpation in hip or over the gluteus medius tendon insertion: negative bilaterally    Positive for tenderness to palpation over the left lateral femoral cutaneous nerve negative on the right.  Tinel's positive over the left lateral femoral cutaneous nerve negative on the right    Lumbar spine Special tests  Neuro tension  Straight leg test negative bilaterally    Slump test negative bilaterally      HIP  FAIR test negative bilaterally    Range of motion in the RIGHT hip is full  in flexion, extension, abduction, internal rotation, external rotation.  Range of motion in the LEFT hip is full  in flexion, extension, abduction, internal rotation, external rotation.      SI joint tests  Observation patient sits on one buttocks: Negative  SI joint compression negative bilaterally    SI joint distraction negative bilaterally    Thigh thrust test negative bilaterally    LEIGHA test negative bilaterally                 Key points for the international standards for neurological classification of spinal cord injury (ISNCSCI) to light touch.     Dermatome R L                                      L2 2 2   L3 2 2   L4 2 2   L5 2 2   S1 2 2   S2 2 2     On the left the area of numbness tingling and burning sensation traces out the area of the left lateral femoral cutaneous nerve.    Motor Exam Lower Extremities    ? Myotome R L   Hip flexion L2 5 5   Knee extension L3 5 5   Ankle dorsiflexion L4 5 5   Toe extension L5 5 5   Ankle plantarflexion S1 5 5         Reflexes  ?  R L                Patella  2+ 2+   Achilles   2+ 2+       Babinski sign negative bilaterally   Clonus  of the ankle negative bilaterally       MEDICAL DECISION MAKING    Medical records review: see under HPI section.     DATA    Labs:   No results found for: SODIUM, POTASSIUM, CHLORIDE, CO2, ANION, GLUCOSE, BUN, CREATININE, CALCIUM, ASTSGOT, ALTSGPT, TBILIRUBIN, ALBUMIN, TOTPROTEIN, GLOBULIN, AGRATIO]    Lab Results   Component Value Date/Time    INR 2.80 05/28/2021 12:00 AM        No results found for: WBC, RBC, HEMOGLOBIN, HEMATOCRIT, MCV, MCH, MCHC, MPV, NEUTSPOLYS, LYMPHOCYTES, MONOCYTES, EOSINOPHILS, BASOPHILS, HYPOCHROMIA, ANISOCYTOSIS     No results found for: HBA1C     Imaging:   No images were available for my interpretation at this time                                                    Diagnosis   Visit Diagnoses     ICD-10-CM   1. 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 Meralgia paresthetica of left side  G57.12   3. Neuralgia  M79.2           ASSESSMENT AND PLAN:  Luci Annzier 36 y.o. female      Luci was seen today for new patient.    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    She has no signs of lumbar radiculopathy.  She has no signs of the hip etiology of the patient's pain.  There are no signs of sciatic entrapment.    Diagnostic workup: Diagnostic and therapeutic procedures below    Medications:      Interventional program:   I have ordered a left lateral femoral cutaneous nerve block for diagnostic and therapeutic purposes      Follow-up: For injection as above pending insurance approval      Please note that this dictation was created using voice recognition software. I have made  every reasonable attempt to correct obvious errors but there may be errors of grammar and content that I may have overlooked prior to finalization of this note.      Rodolfo Luna MD  Physical Medicine and Rehabilitation  Interventional Spine and Sports Physiatry  [facility]            SALVADOR Anguiano.

## 2021-11-04 NOTE — TELEPHONE ENCOUNTER
Left message for pt to have INR checked  4th call  3rd letter sent  INR   Date Value Ref Range Status   05/28/2021 2.80 2 - 3.5 Final     No results found for: MARC Lizarraga, Clinical Pharmacist, CDE, CACP

## 2021-11-04 NOTE — Clinical Note
Dear SALVADOR Anguaino. ,     Thank you for the referral of Luci Mata.      Please see my note for more details       Should you have any questions or concerns please do not hesitate to contact me.    Rodolfo Luna M.D.

## 2021-11-04 NOTE — LETTER
Luci Mata  [address]  [place] [address]    11/04/21    Dear Luci Mata ,    We have been unsuccessful in our attempts to contact you regarding your Anticoagulation Service appointments. Warfarin is a potent blood-thinning agent that requires monitoring to ensure that the dosage is correct for your body.  If it isn't, you could develop serious, sometimes life-threatening bleeding problems or life-threatening blood clots or stroke could result.    To monitor you effectively, we need to be able to communicate with you.  This is a requirement to be followed by our Service.       If you repeatedly fail to keep your lab appointments, you are at risk of being discharged from the Anticoagulation Service.    It is extremely important that you contact the clinic as soon as possible to arrange appropriate follow up.  We are open Monday-Friday 8 am until 5 pm.  You may reach our Service at [phone].           Sincerely,           Sudhir Mcgee PharmD, [facility]  Clinic Supervisor  [facility]  Outpatient Anticoagulation Service

## 2021-11-17 ENCOUNTER — OFFICE VISIT (OUTPATIENT)
Dept: PHYSICAL MEDICINE AND REHAB | Facility: MEDICAL CENTER | Age: 36
End: 2021-11-17
Payer: COMMERCIAL

## 2021-11-17 VITALS
WEIGHT: 205.69 LBS | HEIGHT: 66 IN | SYSTOLIC BLOOD PRESSURE: 124 MMHG | TEMPERATURE: 97 F | HEART RATE: 65 BPM | DIASTOLIC BLOOD PRESSURE: 80 MMHG | BODY MASS INDEX: 33.06 KG/M2 | OXYGEN SATURATION: 99 %

## 2021-11-17 DIAGNOSIS — G57.12 NEUROPATHY OF LEFT LATERAL FEMORAL CUTANEOUS NERVE: ICD-10-CM

## 2021-11-17 DIAGNOSIS — M79.2 NEURALGIA: ICD-10-CM

## 2021-11-17 DIAGNOSIS — G57.12 MERALGIA PARESTHETICA OF LEFT SIDE: ICD-10-CM

## 2021-11-17 PROCEDURE — 64450 NJX AA&/STRD OTHER PN/BRANCH: CPT | Mod: LT | Performed by: PHYSICAL MEDICINE & REHABILITATION

## 2021-11-17 PROCEDURE — 76942 ECHO GUIDE FOR BIOPSY: CPT | Performed by: PHYSICAL MEDICINE & REHABILITATION

## 2021-11-17 RX ORDER — DEXAMETHASONE SODIUM PHOSPHATE 4 MG/ML
4 INJECTION, SOLUTION INTRA-ARTICULAR; INTRALESIONAL; INTRAMUSCULAR; INTRAVENOUS; SOFT TISSUE ONCE
Status: COMPLETED | OUTPATIENT
Start: 2021-11-17 | End: 2021-11-17

## 2021-11-17 RX ADMIN — DEXAMETHASONE SODIUM PHOSPHATE 4 MG: 4 INJECTION, SOLUTION INTRA-ARTICULAR; INTRALESIONAL; INTRAMUSCULAR; INTRAVENOUS; SOFT TISSUE at 16:49

## 2021-11-17 ASSESSMENT — PAIN SCALES - GENERAL: PAINLEVEL: NO PAIN

## 2021-11-17 ASSESSMENT — PATIENT HEALTH QUESTIONNAIRE - PHQ9: CLINICAL INTERPRETATION OF PHQ2 SCORE: 0

## 2021-11-18 NOTE — PROCEDURES
Date of Service: 11/17/2021    Physician/s: Rodolfo Luna MD    Pre-operative Diagnosis: left meralgia paresthetica, lateral femoral cutaneous nerve neuropathy    Post-operative Diagnosis: left  meralgia paresthetica, lateral femoral cutaneous nerve neuropathy      Procedure: left lateral femoral cutaneous nerve block with ultrasound guidance    Description of procedure:    The risks, benefits, and alternatives of the procedure were reviewed and discussed with the patient.  Written informed consent was freely obtained. A pre-procedural time-out was conducted by the physician verifying patient’s identity, procedure to be performed, procedure site and side, and allergy verification. Appropriate equipment was determined to be in place for the procedure.     No sedation was used for this procedure.     A female chaperone was present for the entire procedure. Ana Wilkins.     In the office suite the patient was placed in a supine position and  her and the skin was prepped and draped in the usual sterile fashion.  A syringe with 3 mL of 2% lidocaine and 1 mL of 4 mg/mL of dexamethasone was prepared.  The ultrasound probe was placed over the left lateral aspect of the anterior hip to identify the anterior superior iliac spine, sartorius muscle, iliopsoas muscle and the lateral femoral cutaneous nerve.  I used a 27-gauge 1.5 inch needle with ultrasound guidance in plane approach to guide the needle adjacent to the lateral femoral cutaneous nerve.  Following negative aspiration, the above solution was injected.  The needle was removed intact.  The patient's buttocks was wiped with a 4x4 gauze, the area was cleansed with alcohol prep, and a bandaid was applied. There were no complications noted.     We discussed that afterwards the patient should go on a run to see how this affects her pain.    Rodolfo Luna MD  Interventional Spine and Pain  Physical Medicine and Rehabilitation  [facility]

## 2021-11-30 ENCOUNTER — TELEPHONE (OUTPATIENT)
Dept: PHYSICAL MEDICINE AND REHAB | Facility: REHABILITATION | Age: 36
End: 2021-11-30

## 2021-12-02 ENCOUNTER — TELEPHONE (OUTPATIENT)
Dept: PHYSICAL MEDICINE AND REHAB | Facility: REHABILITATION | Age: 36
End: 2021-12-02

## 2021-12-17 ENCOUNTER — TELEPHONE (OUTPATIENT)
Dept: VASCULAR LAB | Facility: MEDICAL CENTER | Age: 36
End: 2021-12-17

## 2021-12-17 NOTE — LETTER
Luci Mata  [address]  [place] [address]    12/17/21    Dear Luci Mata ,    We have been unsuccessful in our attempts to contact you regarding your Anticoagulation Service appointments. Warfarin is a potent blood-thinning agent that requires monitoring to ensure that the dosage is correct for your body.  If it isn't, you could develop serious, sometimes life-threatening bleeding problems or life-threatening blood clots or stroke could result.    To monitor you effectively, we need to be able to communicate with you.  This is a requirement to be followed by our Service.       If you repeatedly fail to keep your lab appointments, you are at risk of being discharged from the Anticoagulation Service.    It is extremely important that you contact the clinic as soon as possible to arrange appropriate follow up.  We are open Monday-Friday 8 am until 5 pm.  You may reach our Service at [phone].           Sincerely,           Sudhir Mcgee PharmD, [facility]  Clinic Supervisor  [facility]  Outpatient Anticoagulation Service

## 2021-12-17 NOTE — TELEPHONE ENCOUNTER
Left message for pt to have INR checked  5th call  4th letter sent  INR   Date Value Ref Range Status   05/28/2021 2.80 2 - 3.5 Final     No results found for: MARC Lizarraga, Clinical Pharmacist, CDE, CACP

## 2021-12-21 ENCOUNTER — ANTICOAGULATION MONITORING (OUTPATIENT)
Dept: CARDIOLOGY | Facility: MEDICAL CENTER | Age: 36
End: 2021-12-21

## 2021-12-21 DIAGNOSIS — D68.51 HOMOZYGOUS FACTOR V LEIDEN MUTATION (HCC): ICD-10-CM

## 2021-12-21 DIAGNOSIS — Z86.718 HX OF DEEP VENOUS THROMBOSIS: ICD-10-CM

## 2021-12-21 DIAGNOSIS — Z79.01 WARFARIN ANTICOAGULATION: ICD-10-CM

## 2021-12-21 NOTE — PROGRESS NOTES
Discharged from [facility].  Secondary to non adherence/non compliance  Nan Lizarraga, Clinical Pharmacist, CDE, CACP

## 2022-01-10 ENCOUNTER — HOSPITAL ENCOUNTER (OUTPATIENT)
Dept: RADIOLOGY | Facility: MEDICAL CENTER | Age: 37
End: 2022-01-10
Attending: NURSE PRACTITIONER
Payer: COMMERCIAL

## 2022-01-10 DIAGNOSIS — R20.0 THIGH NUMBNESS: ICD-10-CM

## 2022-01-10 DIAGNOSIS — M54.32 SCIATICA OF LEFT SIDE: ICD-10-CM

## 2022-01-10 PROCEDURE — 72100 X-RAY EXAM L-S SPINE 2/3 VWS: CPT

## 2022-01-13 ENCOUNTER — TELEPHONE (OUTPATIENT)
Dept: PHYSICAL MEDICINE AND REHAB | Facility: REHABILITATION | Age: 37
End: 2022-01-13

## 2022-01-17 ENCOUNTER — TELEPHONE (OUTPATIENT)
Dept: PHYSICAL MEDICINE AND REHAB | Facility: MEDICAL CENTER | Age: 37
End: 2022-01-17

## 2022-01-17 NOTE — TELEPHONE ENCOUNTER
Have been unable to contact pt to make appt with Dr. Hassan. Pt has appt with Dr. Luna on 01/19 - Alexandra will have pt call PM&R to make NP appt with Dr. Hassan.

## 2022-01-17 NOTE — TELEPHONE ENCOUNTER
Attempted to call patient back to schedule NP apt with Dr. Jazzy Hassan. Someone picks up phone call but there is no answer, and then the phone line disconnects. I attempted to call back a second time and the same thing occurred again. Unable to leave voicemail .

## 2022-01-18 ENCOUNTER — TELEPHONE (OUTPATIENT)
Dept: MEDICAL GROUP | Facility: MEDICAL CENTER | Age: 37
End: 2022-01-18

## 2022-01-18 ENCOUNTER — APPOINTMENT (OUTPATIENT)
Dept: RADIOLOGY | Facility: MEDICAL CENTER | Age: 37
End: 2022-01-18
Attending: NURSE PRACTITIONER
Payer: COMMERCIAL

## 2022-01-18 ENCOUNTER — OFFICE VISIT (OUTPATIENT)
Dept: MEDICAL GROUP | Facility: MEDICAL CENTER | Age: 37
End: 2022-01-18
Payer: COMMERCIAL

## 2022-01-18 VITALS
HEIGHT: 66 IN | DIASTOLIC BLOOD PRESSURE: 78 MMHG | BODY MASS INDEX: 33.27 KG/M2 | OXYGEN SATURATION: 100 % | WEIGHT: 207 LBS | HEART RATE: 60 BPM | TEMPERATURE: 96.9 F | SYSTOLIC BLOOD PRESSURE: 124 MMHG

## 2022-01-18 DIAGNOSIS — M51.36 DDD (DEGENERATIVE DISC DISEASE), LUMBAR: ICD-10-CM

## 2022-01-18 DIAGNOSIS — M54.42 ACUTE BILATERAL LOW BACK PAIN WITH BILATERAL SCIATICA: ICD-10-CM

## 2022-01-18 DIAGNOSIS — M54.41 ACUTE BILATERAL LOW BACK PAIN WITH BILATERAL SCIATICA: ICD-10-CM

## 2022-01-18 PROCEDURE — 72148 MRI LUMBAR SPINE W/O DYE: CPT

## 2022-01-18 PROCEDURE — 99214 OFFICE O/P EST MOD 30 MIN: CPT | Performed by: NURSE PRACTITIONER

## 2022-01-18 RX ORDER — TRAMADOL HYDROCHLORIDE 50 MG/1
50 TABLET ORAL EVERY 4 HOURS PRN
Qty: 20 TABLET | Refills: 0 | Status: SHIPPED | OUTPATIENT
Start: 2022-01-18 | End: 2022-01-28

## 2022-01-18 RX ORDER — CYCLOBENZAPRINE HCL 10 MG
10 TABLET ORAL 3 TIMES DAILY PRN
COMMUNITY
End: 2022-07-06

## 2022-01-18 NOTE — TELEPHONE ENCOUNTER
Please let pt know that the MRI lumbar shows a small bulging disc but no pushing on the spinal cord.  This will cause pain.

## 2022-01-18 NOTE — LETTER
January 18, 2022        Patient: Luci Mata   YOB: 1985   Date of Visit: 1/18/2022           To Whom It May Concern:    It is my medical opinion that Luci Mata should remain out of participation until Thursday, January 20, 2022 due to a medical condition that is not related to COVID.    If you have any questions or concerns, please don't hesitate to call.        Sincerely,          ISABEL Vargas.  Electronically Signed    [facility]  [id number]   [facility] [id number] [phone] (Phone)  [phone] (Fax)

## 2022-01-18 NOTE — PROGRESS NOTES
Subjective:     Luci Mata is a 36 y.o. female who presents with DDD lumbar spine with LBP.    HPI:   Seen in f/u for DDD lumbar spine with LBP.  She has left hip and low back pain.  She has had to have tx 4x in the past.  She is having pablo leg pain with her lower back pain.  She has been given flexeril and is using that but not controlling her pain.  Very limited in her ability to move d/t the pain.  Xray done recently shows L4-5 mild DDD.  She has appt with physiatry tomorrow.  No saddle anesthesia.  She is on coumadin for hx of DVT, + factor V leiden and chronic coumadin.  She has had s/e to percocet.  Will try ultram.      Patient Active Problem List    Diagnosis Date Noted   • Thigh numbness 08/18/2021   • Migraine with aura and without status migrainosus, not intractable 02/10/2021   • Warfarin anticoagulation 02/10/2021   • Obesity (BMI 30-39.9) 02/10/2021   • Presence of IUD 07/14/2015   • Hx of deep venous thrombosis 05/17/2013   • Vitamin D deficiency 09/22/2011   • Homozygous Factor V Leiden mutation (HCC) 03/08/2009       Current medicines (including changes today)  Current Outpatient Medications   Medication Sig Dispense Refill   • cyclobenzaprine (FLEXERIL) 10 mg Tab Take 10 mg by mouth 3 times a day as needed.     • traMADol (ULTRAM) 50 MG Tab Take 1 Tablet by mouth every four hours as needed for up to 10 days. 20 Tablet 0   • SUMAtriptan (IMITREX) 50 MG Tab Take 1 tablet by mouth 1 time a day as needed for Migraine (May repeat dose ONCE after 2 hours if migraine persists). (Patient not taking: Reported on 11/17/2021) 10 tablet 3   • warfarin (COUMADIN) 5 MG Tab Take 0.5-1 Tablets by mouth every day. As directed by [facility] 90 tablet 1   • Paragard Intrauterine Copper IUD by Intrauterine route.     • ondansetron (ZOFRAN) 4 MG Tab tablet Take 1 tablet by mouth every four hours as needed for Nausea/Vomiting. (Patient not taking: Reported on 11/17/2021) 15 tablet 3     No  "current facility-administered medications for this visit.       No Known Allergies    ROS  Constitutional: Negative. Negative for fever, chills, weight loss, malaise/fatigue and diaphoresis.   HENT: Negative. Negative for hearing loss, ear pain, nosebleeds, congestion, sore throat, neck pain, tinnitus and ear discharge.   Respiratory: Negative. Negative for cough, hemoptysis, sputum production, shortness of breath, wheezing and stridor.   Cardiovascular: Negative. Negative for chest pain, palpitations, orthopnea, claudication, leg swelling and PND.   Gastrointestinal: Denies nausea, vomiting, diarrhea, constipation, heartburn, melena or hematochezia.  Genitourinary: Denies dysuria, hematuria, urinary incontinence, frequency or urgency.        Objective:     /78 (BP Location: Right arm, Patient Position: Sitting)   Pulse 60   Temp 36.1 °C (96.9 °F) (Temporal)   Ht 1.676 m (5' 6\")   Wt 93.9 kg (207 lb)   SpO2 100%  Body mass index is 33.41 kg/m².    Physical Exam:  Vitals reviewed.  Constitutional: Oriented to person, place, and time. appears well-developed and well-nourished. No distress.   Cardiovascular: Normal rate, regular rhythm, normal heart sounds and intact distal pulses. Exam reveals no gallop and no friction rub. No murmur heard. No carotid bruits.   Pulmonary/Chest: Effort normal and breath sounds normal. No stridor. No respiratory distress. no wheezes or rales. exhibits no tenderness.   Musculoskeletal: Normal range of motion. exhibits no edema. pablo pedal pulses 2+. Sensation intact pablo LE.   Lymphadenopathy: No cervical or supraclavicular adenopathy.   Neurological: Alert and oriented to person, place, and time. exhibits normal muscle tone.  Skin: Skin is warm and dry. No diaphoresis.   Psychiatric: Normal mood and affect. Behavior is normal.      Assessment and Plan:     The following treatment plan was discussed:    1. DDD (degenerative disc disease), lumbar  traMADol (ULTRAM) 50 MG Tab    " MR-LUMBAR SPINE-W/O    Referral to Physical Therapy    continue flexeril & f/u w/physiatry tomorrow.  refer PT, use ultram for pain.  MRI lumbar asap since seeing physiatry tomorrow   2. Acute bilateral low back pain with bilateral sciatica  traMADol (ULTRAM) 50 MG Tab    MR-LUMBAR SPINE-W/O    Referral to Physical Therapy    cannot take nsaids d/t coumadin and factor v leiden +         Followup: Return if symptoms worsen or fail to improve.

## 2022-01-19 ENCOUNTER — OFFICE VISIT (OUTPATIENT)
Dept: PHYSICAL MEDICINE AND REHAB | Facility: MEDICAL CENTER | Age: 37
End: 2022-01-19
Payer: COMMERCIAL

## 2022-01-19 VITALS
OXYGEN SATURATION: 97 % | WEIGHT: 207.01 LBS | DIASTOLIC BLOOD PRESSURE: 68 MMHG | TEMPERATURE: 96.1 F | SYSTOLIC BLOOD PRESSURE: 116 MMHG | RESPIRATION RATE: 18 BRPM | HEART RATE: 72 BPM | BODY MASS INDEX: 33.27 KG/M2 | HEIGHT: 66 IN

## 2022-01-19 DIAGNOSIS — M54.16 LUMBAR RADICULOPATHY: ICD-10-CM

## 2022-01-19 DIAGNOSIS — Z79.01 WARFARIN ANTICOAGULATION: ICD-10-CM

## 2022-01-19 DIAGNOSIS — M51.26 LUMBAR DISC HERNIATION: ICD-10-CM

## 2022-01-19 PROCEDURE — 99214 OFFICE O/P EST MOD 30 MIN: CPT | Performed by: PHYSICAL MEDICINE & REHABILITATION

## 2022-01-20 ENCOUNTER — ANTICOAGULATION VISIT (OUTPATIENT)
Dept: MEDICAL GROUP | Facility: MEDICAL CENTER | Age: 37
End: 2022-01-20
Payer: COMMERCIAL

## 2022-01-20 DIAGNOSIS — Z86.718 HX OF DEEP VENOUS THROMBOSIS: ICD-10-CM

## 2022-01-20 LAB — INR PPP: 1.3 (ref 2–3.5)

## 2022-01-20 PROCEDURE — 99211 OFF/OP EST MAY X REQ PHY/QHP: CPT | Performed by: INTERNAL MEDICINE

## 2022-01-20 PROCEDURE — 85610 PROTHROMBIN TIME: CPT | Performed by: INTERNAL MEDICINE

## 2022-01-20 NOTE — PROGRESS NOTES
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     HPI  Patient identification: Luci Mata ,  1985,   With Diagnoses of Lumbar radiculopathy, Lumbar disc herniation, and Warfarin anticoagulation were pertinent to this visit.     Procedures:   2021 left   lateral femoral cutaneous nerve block  90% improved post procedure and the patient returned to running and exercise.     The patient reports that she was doing very well after the lateral femoral cutaneous nerve block however approximately 1 month ago the patient had an acute exacerbation after lifting with a new back pain which radiated down the bilateral legs towards the bilateral ankles with associated numbness and tingling sensation.  This also had a recurrence this week and the pain has been severe in intensity 8 out of 10 intensity.  She has been doing stretches and exercises from physical therapy with mild improvement but still has significant difficulty including with ADLs with lower body dressing and has to have her spouse help her with putting on her shoes and with lower body dressing.    Medications tried including: tramadol which helps but causes fatigue, flexeril, tylenol. She cannot take NSAIDs because of warfarin.      ROS Red Flags :   Fever, Chills, Sweats: Denies  Involuntary Weight Loss: Denies  Bowel/Bladder Incontinence: Denies  Saddle Anesthesia: Denies        PMHx:   History reviewed. No pertinent past medical history.    PSHx:   History reviewed. No pertinent surgical history.    Family history   History reviewed. No pertinent family history.      Medications:   Outpatient Medications Marked as Taking for the 22 encounter (Office Visit) with  Rodolfo Luna M.D.   Medication Sig Dispense Refill   • cyclobenzaprine (FLEXERIL) 10 mg Tab Take 10 mg by mouth 3 times a day as needed.     • traMADol (ULTRAM) 50 MG Tab Take 1 Tablet by mouth every four hours as needed for up to 10 days. 20 Tablet 0   • warfarin (COUMADIN) 5 MG Tab Take 0.5-1 Tablets by mouth every day. As directed by [facility] 90 tablet 1   • Paragard Intrauterine Copper IUD by Intrauterine route.          Current Outpatient Medications on File Prior to Visit   Medication Sig Dispense Refill   • cyclobenzaprine (FLEXERIL) 10 mg Tab Take 10 mg by mouth 3 times a day as needed.     • traMADol (ULTRAM) 50 MG Tab Take 1 Tablet by mouth every four hours as needed for up to 10 days. 20 Tablet 0   • warfarin (COUMADIN) 5 MG Tab Take 0.5-1 Tablets by mouth every day. As directed by [facility] 90 tablet 1   • Paragard Intrauterine Copper IUD by Intrauterine route.       No current facility-administered medications on file prior to visit.         Allergies:   No Known Allergies    Social Hx:   Social History     Socioeconomic History   • Marital status:      Spouse name: Not on file   • Number of children: Not on file   • Years of education: Not on file   • Highest education level: Not on file   Occupational History   • Not on file   Tobacco Use   • Smoking status: Never Smoker   • Smokeless tobacco: Never Used   Substance and Sexual Activity   • Alcohol use: Yes   • Drug use: Never   • Sexual activity: Not on file   Other Topics Concern   • Not on file   Social History Narrative   • Not on file     Social Determinants of Health     Financial Resource Strain:    • Difficulty of Paying Living Expenses: Not on file   Food Insecurity:    • Worried About Running Out of Food in the Last Year: Not on file   • Ran Out of Food in the Last Year: Not on file   Transportation Needs:    • Lack of Transportation (Medical): Not on file   • Lack of Transportation  "(Non-Medical): Not on file   Physical Activity:    • Days of Exercise per Week: Not on file   • Minutes of Exercise per Session: Not on file   Stress:    • Feeling of Stress : Not on file   Social Connections:    • Frequency of Communication with Friends and Family: Not on file   • Frequency of Social Gatherings with Friends and Family: Not on file   • Attends [employer] Services: Not on file   • Active Member of Clubs or Organizations: Not on file   • Attends Club or Organization Meetings: Not on file   • Marital Status: Not on file   Intimate Partner Violence:    • Fear of Current or Ex-Partner: Not on file   • Emotionally Abused: Not on file   • Physically Abused: Not on file   • Sexually Abused: Not on file   Housing Stability:    • Unable to Pay for Housing in the Last Year: Not on file   • Number of Places Lived in the Last Year: Not on file   • Unstable Housing in the Last Year: Not on file         EXAMINATION     Physical Exam:   Vitals: /68 (BP Location: Right arm, Patient Position: Sitting, BP Cuff Size: Adult)   Pulse 72   Temp (!) 35.6 °C (96.1 °F) (Temporal)   Resp 18   Ht 1.676 m (5' 6\")   Wt 93.9 kg (207 lb 0.2 oz)   SpO2 97%     Constitutional:   Body Habitus: Body mass index is 33.41 kg/m².  Cooperation: Fully cooperates with exam  Appearance: Well-groomed no disheveled    Respiratory-  breathing comfortable on room air, no audible wheezing  Cardiovascular- capillary refills less than 2 seconds. No lower extremity edema is noted.   Psychiatric- alert and oriented ×3. Normal affect.    MSK and Neuro: -    Thoracic/Lumbar Spine/Sacral Spine/Hips   decreased active range of motion with flexion, lateral flexion, and rotation bilaterally.   There is decreased active range of motion with lumbar extension.      Palpation:   No tenderness to palpation in midline at T1-T12 levels. No tenderness to palpation in the left and right of the midline T1-L5, NEGATIVE for tenderness to palpation to the " para-midline region in the lower lumbar levels.  palpation over SI joint: negative bilaterally    palpation in hip or over the gluteus medius tendon insertion: negative bilaterally      Lumbar spine Special tests  Neuro tension  Straight leg test positive bilaterally    Slump test positive bilaterally      Key points for the international standards for neurological classification of spinal cord injury (ISNCSCI) to light touch.     Dermatome R L                                      L2 2 2   L3 2 2   L4 1 1   L5 2 2   S1 2 2   S2 2 2     Normal sensation in the distribution of the lateral femoral cutaneous nerve bilaterally.    Motor Exam Lower Extremities    ? Myotome R L   Hip flexion L2 5 5   Knee extension L3 5 5   Ankle dorsiflexion L4 5- 5-   Toe extension L5 5- 5-   Ankle plantarflexion S1 5 5               MEDICAL DECISION MAKING    DATA    Labs:   No results found for: SODIUM, POTASSIUM, CHLORIDE, CO2, GLUCOSE, BUN, CREATININE, BUNCREATRAT, GLOMRATE     Lab Results   Component Value Date/Time    INR 2.80 05/28/2021 12:00 AM        No results found for: WBC, RBC, HEMOGLOBIN, HEMATOCRIT, MCV, MCH, MCHC, MPV, NEUTSPOLYS, LYMPHOCYTES, MONOCYTES, EOSINOPHILS, BASOPHILS, HYPOCHROMIA, ANISOCYTOSIS     No results found for: HBA1C       Imaging:   I personally reviewed following images    MRI lumbar spine 1/18/2022  Central disc protrusion at L4-5 with high intensity zone consistent with annular tear.    I reviewed the following radiology reports                         Results for orders placed in visit on 01/18/22    MR-LUMBAR SPINE-W/O    Impression  Small central disc protrusion with an associated annular fissure at L4-5 without significant encroachment upon the spinal canal. There is no canal stenosis or foraminal narrowing in the lumbar spine.        Results for orders placed in visit on 01/18/22    MR-LUMBAR SPINE-W/O    Impression  Small central disc protrusion with an associated annular fissure at L4-5  without significant encroachment upon the spinal canal. There is no canal stenosis or foraminal narrowing in the lumbar spine.                                                                                                                Results for orders placed during the hospital encounter of 01/10/22    DX-LUMBAR SPINE-2 OR 3 VIEWS    Impression  1.  Mild degenerative disc disease at L4-5.    2.  Vascular stents seen in the area of the iliac bifurcation.                         DIAGNOSIS   Visit Diagnoses     ICD-10-CM   1. Lumbar radiculopathy  M54.16   2. Lumbar disc herniation  M51.26   3. Warfarin anticoagulation  Z79.01         ASSESSMENT and PLAN:     Luci Mata  1985 female      Luci was seen today for follow-up.    Diagnoses and all orders for this visit:    Lumbar radiculopathy  -     Referral to Physical Medicine Rehab    Lumbar disc herniation    Warfarin anticoagulation  -     Prothrombin Time; Future        Patient has significant pain, functional deficits, difficulty with ADLs and with numbness consistent with her acute lumbar disc herniation with radiculopathy.  She is failed conservative treatments described above    I have ordered bilateral L4-5 transforaminal epidural steroid injection    The risks benefits and alternatives to this procedure were discussed and the patient wishes to proceed with the procedure. Risks include but are not limited to damage to surrounding structures, infection, bleeding, worsening of pain which can be permanent, weakness which can be permanent. Benefits include pain relief, improved function. Alternatives includes not doing the procedure.      The patient is on warfarin for factor V Leiden.  We discussed the risk benefits and alternatives to going off of this medication or stay on it through the procedure.  We decided to stay on warfarin through the procedure as long as she is not supratherapeutic we will plan to proceed with the epidural steroid  injection above.    She can continue tramadol and Flexeril.    Follow up: After the above procedure    Thank you for allowing me to participate in the care of this patient. If you have any questions please not hesitate to contact me.             Please note that this dictation was created using voice recognition software. I have made every reasonable attempt to correct obvious errors but there may be errors of grammar and content that I may have overlooked prior to finalization of this note.      Rodolfo Luna MD  Interventional Spine and Sports Physiatry  Physical Medicine and Rehabilitation  [facility]

## 2022-01-20 NOTE — H&P (VIEW-ONLY)
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     HPI  Patient identification: Luci Mata ,  1985,   With Diagnoses of Lumbar radiculopathy, Lumbar disc herniation, and Warfarin anticoagulation were pertinent to this visit.     Procedures:   2021 left   lateral femoral cutaneous nerve block  90% improved post procedure and the patient returned to running and exercise.     The patient reports that she was doing very well after the lateral femoral cutaneous nerve block however approximately 1 month ago the patient had an acute exacerbation after lifting with a new back pain which radiated down the bilateral legs towards the bilateral ankles with associated numbness and tingling sensation.  This also had a recurrence this week and the pain has been severe in intensity 8 out of 10 intensity.  She has been doing stretches and exercises from physical therapy with mild improvement but still has significant difficulty including with ADLs with lower body dressing and has to have her spouse help her with putting on her shoes and with lower body dressing.    Medications tried including: tramadol which helps but causes fatigue, flexeril, tylenol. She cannot take NSAIDs because of warfarin.      ROS Red Flags :   Fever, Chills, Sweats: Denies  Involuntary Weight Loss: Denies  Bowel/Bladder Incontinence: Denies  Saddle Anesthesia: Denies        PMHx:   History reviewed. No pertinent past medical history.    PSHx:   History reviewed. No pertinent surgical history.    Family history   History reviewed. No pertinent family history.      Medications:   Outpatient Medications Marked as Taking for the 22 encounter (Office Visit) with  Rodolfo Luna M.D.   Medication Sig Dispense Refill   • cyclobenzaprine (FLEXERIL) 10 mg Tab Take 10 mg by mouth 3 times a day as needed.     • traMADol (ULTRAM) 50 MG Tab Take 1 Tablet by mouth every four hours as needed for up to 10 days. 20 Tablet 0   • warfarin (COUMADIN) 5 MG Tab Take 0.5-1 Tablets by mouth every day. As directed by [facility] 90 tablet 1   • Paragard Intrauterine Copper IUD by Intrauterine route.          Current Outpatient Medications on File Prior to Visit   Medication Sig Dispense Refill   • cyclobenzaprine (FLEXERIL) 10 mg Tab Take 10 mg by mouth 3 times a day as needed.     • traMADol (ULTRAM) 50 MG Tab Take 1 Tablet by mouth every four hours as needed for up to 10 days. 20 Tablet 0   • warfarin (COUMADIN) 5 MG Tab Take 0.5-1 Tablets by mouth every day. As directed by [facility] 90 tablet 1   • Paragard Intrauterine Copper IUD by Intrauterine route.       No current facility-administered medications on file prior to visit.         Allergies:   No Known Allergies    Social Hx:   Social History     Socioeconomic History   • Marital status:      Spouse name: Not on file   • Number of children: Not on file   • Years of education: Not on file   • Highest education level: Not on file   Occupational History   • Not on file   Tobacco Use   • Smoking status: Never Smoker   • Smokeless tobacco: Never Used   Substance and Sexual Activity   • Alcohol use: Yes   • Drug use: Never   • Sexual activity: Not on file   Other Topics Concern   • Not on file   Social History Narrative   • Not on file     Social Determinants of Health     Financial Resource Strain:    • Difficulty of Paying Living Expenses: Not on file   Food Insecurity:    • Worried About Running Out of Food in the Last Year: Not on file   • Ran Out of Food in the Last Year: Not on file   Transportation Needs:    • Lack of Transportation (Medical): Not on file   • Lack of Transportation  "(Non-Medical): Not on file   Physical Activity:    • Days of Exercise per Week: Not on file   • Minutes of Exercise per Session: Not on file   Stress:    • Feeling of Stress : Not on file   Social Connections:    • Frequency of Communication with Friends and Family: Not on file   • Frequency of Social Gatherings with Friends and Family: Not on file   • Attends Church Services: Not on file   • Active Member of Clubs or Organizations: Not on file   • Attends Club or Organization Meetings: Not on file   • Marital Status: Not on file   Intimate Partner Violence:    • Fear of Current or Ex-Partner: Not on file   • Emotionally Abused: Not on file   • Physically Abused: Not on file   • Sexually Abused: Not on file   Housing Stability:    • Unable to Pay for Housing in the Last Year: Not on file   • Number of Places Lived in the Last Year: Not on file   • Unstable Housing in the Last Year: Not on file         EXAMINATION     Physical Exam:   Vitals: /68 (BP Location: Right arm, Patient Position: Sitting, BP Cuff Size: Adult)   Pulse 72   Temp (!) 35.6 °C (96.1 °F) (Temporal)   Resp 18   Ht 1.676 m (5' 6\")   Wt 93.9 kg (207 lb 0.2 oz)   SpO2 97%     Constitutional:   Body Habitus: Body mass index is 33.41 kg/m².  Cooperation: Fully cooperates with exam  Appearance: Well-groomed no disheveled    Respiratory-  breathing comfortable on room air, no audible wheezing  Cardiovascular- capillary refills less than 2 seconds. No lower extremity edema is noted.   Psychiatric- alert and oriented ×3. Normal affect.    MSK and Neuro: -    Thoracic/Lumbar Spine/Sacral Spine/Hips   decreased active range of motion with flexion, lateral flexion, and rotation bilaterally.   There is decreased active range of motion with lumbar extension.      Palpation:   No tenderness to palpation in midline at T1-T12 levels. No tenderness to palpation in the left and right of the midline T1-L5, NEGATIVE for tenderness to palpation to the " para-midline region in the lower lumbar levels.  palpation over SI joint: negative bilaterally    palpation in hip or over the gluteus medius tendon insertion: negative bilaterally      Lumbar spine Special tests  Neuro tension  Straight leg test positive bilaterally    Slump test positive bilaterally      Key points for the international standards for neurological classification of spinal cord injury (ISNCSCI) to light touch.     Dermatome R L                                      L2 2 2   L3 2 2   L4 1 1   L5 2 2   S1 2 2   S2 2 2     Normal sensation in the distribution of the lateral femoral cutaneous nerve bilaterally.    Motor Exam Lower Extremities    ? Myotome R L   Hip flexion L2 5 5   Knee extension L3 5 5   Ankle dorsiflexion L4 5- 5-   Toe extension L5 5- 5-   Ankle plantarflexion S1 5 5               MEDICAL DECISION MAKING    DATA    Labs:   No results found for: SODIUM, POTASSIUM, CHLORIDE, CO2, GLUCOSE, BUN, CREATININE, BUNCREATRAT, GLOMRATE     Lab Results   Component Value Date/Time    INR 2.80 05/28/2021 12:00 AM        No results found for: WBC, RBC, HEMOGLOBIN, HEMATOCRIT, MCV, MCH, MCHC, MPV, NEUTSPOLYS, LYMPHOCYTES, MONOCYTES, EOSINOPHILS, BASOPHILS, HYPOCHROMIA, ANISOCYTOSIS     No results found for: HBA1C       Imaging:   I personally reviewed following images    MRI lumbar spine 1/18/2022  Central disc protrusion at L4-5 with high intensity zone consistent with annular tear.    I reviewed the following radiology reports                         Results for orders placed in visit on 01/18/22    MR-LUMBAR SPINE-W/O    Impression  Small central disc protrusion with an associated annular fissure at L4-5 without significant encroachment upon the spinal canal. There is no canal stenosis or foraminal narrowing in the lumbar spine.        Results for orders placed in visit on 01/18/22    MR-LUMBAR SPINE-W/O    Impression  Small central disc protrusion with an associated annular fissure at L4-5  without significant encroachment upon the spinal canal. There is no canal stenosis or foraminal narrowing in the lumbar spine.                                                                                                                Results for orders placed during the hospital encounter of 01/10/22    DX-LUMBAR SPINE-2 OR 3 VIEWS    Impression  1.  Mild degenerative disc disease at L4-5.    2.  Vascular stents seen in the area of the iliac bifurcation.                         DIAGNOSIS   Visit Diagnoses     ICD-10-CM   1. Lumbar radiculopathy  M54.16   2. Lumbar disc herniation  M51.26   3. Warfarin anticoagulation  Z79.01         ASSESSMENT and PLAN:     Luci Mata  1985 female      Luci was seen today for follow-up.    Diagnoses and all orders for this visit:    Lumbar radiculopathy  -     Referral to Physical Medicine Rehab    Lumbar disc herniation    Warfarin anticoagulation  -     Prothrombin Time; Future        Patient has significant pain, functional deficits, difficulty with ADLs and with numbness consistent with her acute lumbar disc herniation with radiculopathy.  She is failed conservative treatments described above    I have ordered bilateral L4-5 transforaminal epidural steroid injection    The risks benefits and alternatives to this procedure were discussed and the patient wishes to proceed with the procedure. Risks include but are not limited to damage to surrounding structures, infection, bleeding, worsening of pain which can be permanent, weakness which can be permanent. Benefits include pain relief, improved function. Alternatives includes not doing the procedure.      The patient is on warfarin for factor V Leiden.  We discussed the risk benefits and alternatives to going off of this medication or stay on it through the procedure.  We decided to stay on warfarin through the procedure as long as she is not supratherapeutic we will plan to proceed with the epidural steroid  injection above.    She can continue tramadol and Flexeril.    Follow up: After the above procedure    Thank you for allowing me to participate in the care of this patient. If you have any questions please not hesitate to contact me.             Please note that this dictation was created using voice recognition software. I have made every reasonable attempt to correct obvious errors but there may be errors of grammar and content that I may have overlooked prior to finalization of this note.      Rodolfo Luna MD  Interventional Spine and Sports Physiatry  Physical Medicine and Rehabilitation  [facility]

## 2022-01-20 NOTE — PROGRESS NOTES
Anticoagulation Summary  As of 2022    INR goal:  2.0-3.0   TTR:  --   INR used for dosin.30 (2022)   Warfarin maintenance plan:  5 mg (5 mg x 1) every Mon; 2.5 mg (5 mg x 0.5) all other days   Weekly warfarin total:  20 mg   Plan last modified:  Chuck Birch, PharmD (2022)   Next INR check:     Target end date:  Indefinite         Anticoagulation Episode Summary     INR check location:      Preferred lab:      Send INR reminders to:      Comments:        Anticoagulation Care Providers     Provider Role Specialty Phone number    [facility] Responsible  [phone]           Refer to Patient Findings for HPI:  Patient Findings     Negatives:  Signs/symptoms of thrombosis, Signs/symptoms of bleeding, Laboratory test error suspected, Change in health, Change in alcohol use, Change in activity, Upcoming invasive procedure, Emergency department visit, Upcoming dental procedure, Missed doses, Extra doses, Change in medications, Change in diet/appetite, Hospital admission, Bruising, Other complaints          There were no vitals filed for this visit.  pt declined vitals    Verified current warfarin dosing schedule.    Medications reconciled   Pt is not on antiplatelet therapy      A/P   Luci is re-establishing with our clinic after being discharged for non-compliance. She requested an appointment because she wants to switch to a DOAC. Previously patient was not switched due to a DOAC due to concerns of compliance. Discussed with patient in depth the importance of taking a DOAC on time and as directed.  Currently her INR is 1.3 and patient reports she missed her warfarin on Tuesday.     Warfarin dosing recommendation: Stop warfarin. Start taking eliquis 5mg BID.      Pt is new to Eliquis and re-establishing with [facility]. Discussed:   · Indication for DOAC therapy.  · Importance of monitoring and compliance.   · Monitoring parameters, signs and symptoms of bleeding or  clotting.  · DOAC therapy, side effects, potential DDIs, OTC medications  · Hormonal therapy n/a  · Pregnancy n/a  · DDI no significant drug interactions found  · Pt is not on antiplatelet/NSAID therapy   · Lifestyle safety, ie smoking, ETOH, hobby safety, fall safety/prevention  · Procedures for missed doses or suspected missed doses, surgeries/procedures, travel, dental work, any medication changes    Gave patient 2 weeks of samples    Pt educated to contact our clinic with any changes in medications or s/s of bleeding or thrombosis. Pt is aware to seek immediate medical attention for falls, head injury or deep cuts.    Follow up appointment in two weeks in person.     Ordered CMP and CBC.    Chuck Birch, PharmD      Cc.  Dr. Bloch, MD Jason Dent, PharmD    Addendum:     Initially instructed patient to start apixaban 5mg BID while in clinic. Called patient to increase dose to 10mg BID for x7 days. Based on recommendation from Dr. Bloch called patient back and told them start 5mg BID. Luci had not taken any eliquis and confirmed that she would be taking 1 tablet twice a day.     Chuck Birch, PharmD

## 2022-01-25 ENCOUNTER — HOSPITAL ENCOUNTER (OUTPATIENT)
Facility: REHABILITATION | Age: 37
End: 2022-01-25
Attending: PHYSICAL MEDICINE & REHABILITATION | Admitting: PHYSICAL MEDICINE & REHABILITATION
Payer: COMMERCIAL

## 2022-01-25 ENCOUNTER — APPOINTMENT (OUTPATIENT)
Dept: RADIOLOGY | Facility: REHABILITATION | Age: 37
End: 2022-01-25
Attending: PHYSICAL MEDICINE & REHABILITATION
Payer: COMMERCIAL

## 2022-01-25 VITALS
RESPIRATION RATE: 16 BRPM | WEIGHT: 205.03 LBS | DIASTOLIC BLOOD PRESSURE: 70 MMHG | HEIGHT: 66 IN | SYSTOLIC BLOOD PRESSURE: 127 MMHG | BODY MASS INDEX: 32.95 KG/M2 | HEART RATE: 72 BPM | OXYGEN SATURATION: 99 % | TEMPERATURE: 98.1 F

## 2022-01-25 PROCEDURE — 700111 HCHG RX REV CODE 636 W/ 250 OVERRIDE (IP)

## 2022-01-25 PROCEDURE — 64483 NJX AA&/STRD TFRM EPI L/S 1: CPT

## 2022-01-25 PROCEDURE — 700117 HCHG RX CONTRAST REV CODE 255

## 2022-01-25 RX ORDER — DEXAMETHASONE SODIUM PHOSPHATE 10 MG/ML
INJECTION, SOLUTION INTRAMUSCULAR; INTRAVENOUS
Status: COMPLETED
Start: 2022-01-25 | End: 2022-01-25

## 2022-01-25 RX ORDER — LIDOCAINE HYDROCHLORIDE 10 MG/ML
INJECTION, SOLUTION EPIDURAL; INFILTRATION; INTRACAUDAL; PERINEURAL
Status: COMPLETED
Start: 2022-01-25 | End: 2022-01-25

## 2022-01-25 RX ADMIN — LIDOCAINE HYDROCHLORIDE 10 ML: 10 INJECTION, SOLUTION EPIDURAL; INFILTRATION; INTRACAUDAL; PERINEURAL at 10:57

## 2022-01-25 RX ADMIN — IOHEXOL 5 ML: 240 INJECTION, SOLUTION INTRATHECAL; INTRAVASCULAR; INTRAVENOUS; ORAL at 10:57

## 2022-01-25 RX ADMIN — DEXAMETHASONE SODIUM PHOSPHATE 10 MG: 10 INJECTION, SOLUTION INTRAMUSCULAR; INTRAVENOUS at 10:57

## 2022-01-25 ASSESSMENT — PAIN DESCRIPTION - PAIN TYPE
TYPE: CHRONIC PAIN
TYPE: CHRONIC PAIN

## 2022-01-25 NOTE — INTERVAL H&P NOTE
H&P reviewed. The patient was examined and there are no changes to the H&P     Lungs clear to auscultation bilaterally.  No abdominal tenderness.  Heart regular rate and rhythm.  Vitals reviewed.    Proceed with the originally scheduled procedure.  The risks, benefits and alternatives were discussed.  The patient understands these.    Pre-Op Diagnosis Codes:     * Lumbar radiculopathy [M54.16]  Procedure(s):  BILATERAL L4-5 transforaminal epidural steroid injection with fluoroscopic guidance    Rodolfo Luna MD  Physical Medicine and Rehabilitation  Interventional Spine and Sports Physiatry  [facility]

## 2022-01-25 NOTE — PROGRESS NOTES
1006 Pt admitted to Pre Procedure area.  Consent signed and post op instructions reviewed with pt.  Medical hx and allergies reviewed.  Hand off given to procedural RN prior to procedure.     1021 Dr. Luna in to see pt.

## 2022-01-25 NOTE — PROGRESS NOTES
Preprocedure assessment completed.  Pertinent health information(Hemmoragic disorder) communicated to physician and staff prior to time out.  Patient positioned preprocedure by RN, CSTand XRAY.  Foam wedge under ankles for support.

## 2022-01-25 NOTE — OP REPORT
Date of Service: 1/25/2022     Patient: Luci Mata 36 y.o. female     MRN: [id number]     Physician/s: Rodolfo Luna MD    Pre-operative Diagnosis: Lumbar radiculopathy    Post-operative Diagnosis: Lumbar radiculopathy    Procedure: bilateral  Lumbar Transforaminal Epidural Steroid  at the L4-5 levels.     Description of procedure:    The risks, benefits, and alternatives of the procedure were reviewed and discussed with the patient.  Written informed consent was freely obtained. A pre-procedural time-out was conducted by the physician verifying patient’s identity, procedure to be performed, procedure site and side, and allergy verification. Appropriate equipment was determined to be in place for the procedure.         The patient's vital signs were carefully monitored before, throughout, and after the procedure.     In the fluoroscopy suite the patient was placed in a prone position, a pillow placed underneath their umbilicus. The skin was prepped and draped in the usual sterile fashion.     The fluoroscope was placed over the lumbar spine and adjusted into the proper AP/Oblique view to enter the transforaminal space just adjacent to the pedicle at the levels below. The targets for injection were then marked at the right L4-5. A 27g 1.5 inch needle was placed into the marked site, and 1mL of 1% Lidocaine was injected subcutaneously into the epidermal and dermal layers. The needle was removed intact.  A 22g 5 inch spinal needle was then placed and advanced under fluoroscopic guidance in an oblique view towards the epidural space of the levels noted above. The needle position was confirmed to not be past the 6 o'clock position in the AP view and it was in the neuroforamen in the lateral view.        The fluoroscope was was then adjusted over the lumbar spine and adjusted into the proper AP/Oblique view to enter the transforaminal space adjacent to the pedicle at the LEFT  L4-5. A 27g 1.5 inch needle was  placed into the marked site, and 1mL of 1% Lidocaine was injected subcutaneously into the epidermal and dermal layers. The needle was removed intact.  A 22g 5 inch spinal needle was then placed and advanced under fluoroscopic guidance in an oblique view towards the epidural space of the levels noted above. The needle position was confirmed to not be past the 6 o'clock position in the AP view and it was in the neuroforamen in the lateral view.       Under live fluoroscopic guidance in the AP view, contrast dye was used to highlight the epidural space spread of each level above. Final fluoroscopic images were saved.  Following negative aspiration, 1mL of 1% lidocaine preservative free with 5mg dexamethasone   was then injected at each level above, and the needles were removed intact after restyleted. The patient's back was covered with a 4x4 gauze, the area was cleansed with sterile normal saline, and a dressing was applied. There were no complications noted.     The patient was then evaluated post-procedure, and was hemodynamically stable prior to leaving the post-operative care unit.     Follow-up as scheduled    Rodolfo Luna MD  Interventional Spine and Pain  Physical Medicine and Rehabilitation  [facility]          CPT codes  Transforaminal epidural injection- lumbar or sacral (first level):  73994-45

## 2022-01-25 NOTE — PROGRESS NOTES
1103 Pt received to Post Procedure area, report received from RN.  Vital signs taken, pt provided with snack.  Bandage intact, no drainage.  Ice pack offered.    1121 Pt seen by Dr. Luna post procedure, orders received for discharge.  Pt ambulated without difficulty, accompanied to car.  Discharged to designated .

## 2022-01-26 ENCOUNTER — TELEPHONE (OUTPATIENT)
Dept: PHYSICAL MEDICINE AND REHAB | Facility: MEDICAL CENTER | Age: 37
End: 2022-01-26

## 2022-01-26 NOTE — TELEPHONE ENCOUNTER
Spoke to Pt in regards to her SP that was done with Dr. Luna dated 1/25/22 for her bilateral L4-5 transforaminal epidural steroid injection with fluoroscopic guidance and she mentioned that she is fine.

## 2022-01-27 ENCOUNTER — OFFICE VISIT (OUTPATIENT)
Dept: PHYSICAL MEDICINE AND REHAB | Facility: REHABILITATION | Age: 37
End: 2022-01-27
Payer: COMMERCIAL

## 2022-01-27 VITALS
OXYGEN SATURATION: 98 % | WEIGHT: 200 LBS | DIASTOLIC BLOOD PRESSURE: 62 MMHG | BODY MASS INDEX: 32.14 KG/M2 | RESPIRATION RATE: 14 BRPM | HEART RATE: 69 BPM | SYSTOLIC BLOOD PRESSURE: 108 MMHG | HEIGHT: 66 IN | TEMPERATURE: 97 F

## 2022-01-27 DIAGNOSIS — M54.16 LUMBAR RADICULOPATHY: ICD-10-CM

## 2022-01-27 PROCEDURE — 99999 PR NO CHARGE: CPT | Performed by: PHYSICAL MEDICINE & REHABILITATION

## 2022-01-27 ASSESSMENT — PATIENT HEALTH QUESTIONNAIRE - PHQ9: CLINICAL INTERPRETATION OF PHQ2 SCORE: 0

## 2022-01-27 NOTE — PROGRESS NOTES
Patient inappropriately scheduled in the neuro rehabilitation clinic.  This is a patient of Dr. Luna's who is getting epidural steroid injections.  No authorized referral for Dr. Jazzy Hassan.  Co-pay will be refunded.  Patient not charged for this encounter.

## 2022-02-09 ENCOUNTER — ANTICOAGULATION MONITORING (OUTPATIENT)
Dept: VASCULAR LAB | Facility: MEDICAL CENTER | Age: 37
End: 2022-02-09

## 2022-02-23 ENCOUNTER — OFFICE VISIT (OUTPATIENT)
Dept: PHYSICAL MEDICINE AND REHAB | Facility: MEDICAL CENTER | Age: 37
End: 2022-02-23
Payer: COMMERCIAL

## 2022-02-23 VITALS
DIASTOLIC BLOOD PRESSURE: 78 MMHG | SYSTOLIC BLOOD PRESSURE: 120 MMHG | WEIGHT: 209.22 LBS | HEART RATE: 101 BPM | HEIGHT: 66 IN | TEMPERATURE: 97.1 F | OXYGEN SATURATION: 97 % | BODY MASS INDEX: 33.62 KG/M2

## 2022-02-23 DIAGNOSIS — M51.26 LUMBAR DISC HERNIATION: ICD-10-CM

## 2022-02-23 DIAGNOSIS — G57.12 MERALGIA PARESTHETICA OF LEFT SIDE: ICD-10-CM

## 2022-02-23 DIAGNOSIS — M54.16 LUMBAR RADICULOPATHY: ICD-10-CM

## 2022-02-23 PROCEDURE — 99214 OFFICE O/P EST MOD 30 MIN: CPT | Performed by: PHYSICAL MEDICINE & REHABILITATION

## 2022-02-23 RX ORDER — WARFARIN SODIUM 5 MG/1
2.5 TABLET ORAL DAILY
COMMUNITY
End: 2022-07-06

## 2022-02-23 ASSESSMENT — PAIN SCALES - GENERAL: PAINLEVEL: NO PAIN

## 2022-02-23 ASSESSMENT — PATIENT HEALTH QUESTIONNAIRE - PHQ9: CLINICAL INTERPRETATION OF PHQ2 SCORE: 0

## 2022-02-24 NOTE — PROGRESS NOTES
Follow up patient note  Interventional spine and Pain  Physiatry (physical medicine and  Rehabilitation)       Chief complaint:   Chief Complaint   Patient presents with   • Follow-Up     Back pain          HISTORY    Please see new patient note by Dr Luna,  for more details.     HPI  Patient identification: Luci Mata ,  1985,   With Diagnoses of Lumbar radiculopathy, Lumbar disc herniation, and Meralgia paresthetica of left side were pertinent to this visit.     Procedures:   2021 left   lateral femoral cutaneous nerve block  90% improved post procedure and the patient returned to running and exercise.   2022 bilateral L4-5 transforaminal epidural steroid injection    The patient is very happy with the results of this reports 90% pain improvement post procedure pain is 1 out of 10 intensity aching quality nonradiating.  She denies numbness tingling or weakness.  Significant functional improvement now she can do her home exercise program.      Medications tried including: tramadol which helps but causes fatigue, flexeril, tylenol. She cannot take NSAIDs because of warfarin.      ROS Red Flags :   Fever, Chills, Sweats: Denies  Involuntary Weight Loss: Denies  Bowel/Bladder Incontinence: Denies  Saddle Anesthesia: Denies        PMHx:   Past Medical History:   Diagnosis Date   • Hemorrhagic disorder (HCC)        PSHx:   Past Surgical History:   Procedure Laterality Date   • OR NJX AA&/STRD TFRML EPI LUMBAR/SACRAL 1 LEVEL Bilateral 2022    Procedure: BILATERAL L4-5 transforaminal epidural steroid injection with fluoroscopic guidance;  Surgeon: Rodolfo Luna M.D.;  [facility];  Service: Pain Management       Family history   History reviewed. No pertinent family history.      Medications:   Outpatient Medications Marked as Taking for the 22 encounter (Office Visit) with Rodolfo Luna M.D.   Medication Sig Dispense Refill   • warfarin  "(COUMADIN) 5 MG Tab Take 2.5 mg by mouth every day.     • apixaban (ELIQUIS) 5mg Tab Take 1 Tablet by mouth 2 times a day. 60 Tablet 5        Current Outpatient Medications on File Prior to Visit   Medication Sig Dispense Refill   • warfarin (COUMADIN) 5 MG Tab Take 2.5 mg by mouth every day.     • apixaban (ELIQUIS) 5mg Tab Take 1 Tablet by mouth 2 times a day. 60 Tablet 5   • cyclobenzaprine (FLEXERIL) 10 mg Tab Take 10 mg by mouth 3 times a day as needed. (Patient not taking: Reported on 2/23/2022)     • Paragard Intrauterine Copper IUD by Intrauterine route.       No current facility-administered medications on file prior to visit.         Allergies:   No Known Allergies    Social Hx:   Social History     Socioeconomic History   • Marital status:      Spouse name: Not on file   • Number of children: Not on file   • Years of education: Not on file   • Highest education level: Not on file   Occupational History   • Not on file   Tobacco Use   • Smoking status: Never Smoker   • Smokeless tobacco: Never Used   Vaping Use   • Vaping Use: Never used   Substance and Sexual Activity   • Alcohol use: Yes   • Drug use: Never   • Sexual activity: Not on file   Other Topics Concern   • Not on file   Social History Narrative   • Not on file     Social Determinants of Health     Financial Resource Strain: Not on file   Food Insecurity: Not on file   Transportation Needs: Not on file   Physical Activity: Not on file   Stress: Not on file   Social Connections: Not on file   Intimate Partner Violence: Not on file   Housing Stability: Not on file         EXAMINATION     Physical Exam:   Vitals: /78 (BP Location: Right arm, Patient Position: Sitting, BP Cuff Size: Adult)   Pulse (!) 101   Temp 36.2 °C (97.1 °F) (Temporal)   Ht 1.676 m (5' 6\")   Wt 94.9 kg (209 lb 3.5 oz)   SpO2 97%     Constitutional:   Body Habitus: Body mass index is 33.77 kg/m².  Cooperation: Fully cooperates with exam  Appearance: " Well-groomed no disheveled    Respiratory-  breathing comfortable on room air, no audible wheezing  Cardiovascular- capillary refills less than 2 seconds. No lower extremity edema is noted.   Psychiatric- alert and oriented ×3. Normal affect.    MSK and Neuro: -        Thoracic/Lumbar Spine/Sacral Spine/Hips     full  active range of motion with flexion, lateral flexion, and rotation bilaterally.   There is full  active range of motion with lumbar extension.    There is no  pain with lumbar extension.   There is no pain with facet loading maneuver (extension rotation) with axial low back pain on the BILATERAL side(s)    Palpation:   No tenderness to palpation in midline at T1-T12 levels. No tenderness to palpation in the left and right of the midline T1-L5, NEGATIVE for tenderness to palpation to the para-midline region in the lower lumbar levels.  palpation over SI joint: negative bilaterally    palpation in hip or over the gluteus medius tendon insertion: negative bilaterally      Lumbar spine Special tests  Neuro tension  Straight leg test negative bilaterally    Slump test negative bilaterally      Key points for the international standards for neurological classification of spinal cord injury (ISNCSCI) to light touch.     Dermatome R L                                      L2 2 2   L3 2 2   L4 2 2   L5 2 2   S1 2 2   S2 2 2         Motor Exam Lower Extremities    ? Myotome R L   Hip flexion L2 5 5   Knee extension L3 5 5   Ankle dorsiflexion L4 5 5   Toe extension L5 5 5   Ankle plantarflexion S1 5 5                 MEDICAL DECISION MAKING    DATA    Labs:   No results found for: SODIUM, POTASSIUM, CHLORIDE, CO2, GLUCOSE, BUN, CREATININE, BUNCREATRAT, GLOMRATE     Lab Results   Component Value Date/Time    INR 1.3 (A) 01/20/2022 07:36 AM        No results found for: WBC, RBC, HEMOGLOBIN, HEMATOCRIT, MCV, MCH, MCHC, MPV, NEUTSPOLYS, LYMPHOCYTES, MONOCYTES, EOSINOPHILS, BASOPHILS, HYPOCHROMIA, ANISOCYTOSIS     No  results found for: HBA1C       Imaging:   I personally reviewed following images    MRI lumbar spine 2022  Central disc protrusion at L4-5 with high intensity zone consistent with annular tear.    I reviewed the following radiology reports                         Results for orders placed in visit on 22    MR-LUMBAR SPINE-W/O    Impression  Small central disc protrusion with an associated annular fissure at L4-5 without significant encroachment upon the spinal canal. There is no canal stenosis or foraminal narrowing in the lumbar spine.        Results for orders placed in visit on 22    MR-LUMBAR SPINE-W/O    Impression  Small central disc protrusion with an associated annular fissure at L4-5 without significant encroachment upon the spinal canal. There is no canal stenosis or foraminal narrowing in the lumbar spine.                                                                                                                Results for orders placed during the hospital encounter of 01/10/22    DX-LUMBAR SPINE-2 OR 3 VIEWS    Impression  1.  Mild degenerative disc disease at L4-5.    2.  Vascular stents seen in the area of the iliac bifurcation.                         DIAGNOSIS   Visit Diagnoses     ICD-10-CM   1. Lumbar radiculopathy  M54.16   2. Lumbar disc herniation  M51.26   3. Meralgia paresthetica of left side  G57.12         ASSESSMENT and PLAN:     Luci Anguiano Adolfo  1985 female      Luci was seen today for follow-up.    Diagnoses and all orders for this visit:    Lumbar radiculopathy    Lumbar disc herniation    Meralgia paresthetica of left side        The above issues are now stable  Patient had excellent results with 90% improvement in pain after the epidural steroid injection.  We discussed additions to her home exercise program.  I provided the patient with a home exercise program.  We discussed physical therapy but she preferred to do her exercise program at  home        Follow up: As needed with me    Thank you for allowing me to participate in the care of this patient. If you have any questions please not hesitate to contact me.             Please note that this dictation was created using voice recognition software. I have made every reasonable attempt to correct obvious errors but there may be errors of grammar and content that I may have overlooked prior to finalization of this note.      Rodolfo Luna MD  Interventional Spine and Sports Physiatry  Physical Medicine and Rehabilitation  [facility]

## 2022-03-04 ENCOUNTER — ANTICOAGULATION MONITORING (OUTPATIENT)
Dept: VASCULAR LAB | Facility: MEDICAL CENTER | Age: 37
End: 2022-03-04
Payer: COMMERCIAL

## 2022-03-04 NOTE — PROGRESS NOTES
Discharged from [facility].  Pt managed by PCP  Nan Lizarraga, Clinical Pharmacist, CDE, CACP

## 2022-07-06 ENCOUNTER — PHARMACY VISIT (OUTPATIENT)
Dept: PHARMACY | Facility: MEDICAL CENTER | Age: 37
End: 2022-07-06
Payer: COMMERCIAL

## 2022-07-06 ENCOUNTER — ANTICOAGULATION VISIT (OUTPATIENT)
Dept: VASCULAR LAB | Facility: MEDICAL CENTER | Age: 37
End: 2022-07-06
Attending: INTERNAL MEDICINE
Payer: COMMERCIAL

## 2022-07-06 DIAGNOSIS — D68.51 HOMOZYGOUS FACTOR V LEIDEN MUTATION (HCC): ICD-10-CM

## 2022-07-06 DIAGNOSIS — Z79.01 WARFARIN ANTICOAGULATION: ICD-10-CM

## 2022-07-06 DIAGNOSIS — Z86.718 HX OF DEEP VENOUS THROMBOSIS: ICD-10-CM

## 2022-07-06 LAB — INR PPP: 1.1 (ref 2–3.5)

## 2022-07-06 PROCEDURE — RXMED WILLOW AMBULATORY MEDICATION CHARGE: Performed by: NURSE PRACTITIONER

## 2022-07-06 PROCEDURE — 85610 PROTHROMBIN TIME: CPT

## 2022-07-06 PROCEDURE — 99212 OFFICE O/P EST SF 10 MIN: CPT

## 2022-07-06 RX ORDER — ENOXAPARIN SODIUM 100 MG/ML
100 INJECTION SUBCUTANEOUS DAILY
COMMUNITY
End: 2023-09-27

## 2022-07-06 RX ORDER — WARFARIN SODIUM 5 MG/1
5 TABLET ORAL DAILY
Qty: 90 TABLET | Refills: 1 | Status: SHIPPED | OUTPATIENT
Start: 2022-07-06 | End: 2023-09-27

## 2022-07-06 NOTE — PROGRESS NOTES
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]  Hx of deep venous thrombosis [Z86.718]  Homozygous Factor V Leiden mutation (HCC) [D68.51]  Warfarin anticoagulation [Z79.01]             Anticoagulation Episode Summary     INR check location:      Preferred lab:      Send INR reminders to:      Comments:  22 DOAC unaffordable      Anticoagulation Care Providers     Provider Role Specialty Phone number    [facility]   [phone]                Refer to Patient Findings for HPI:  Patient Findings     Positives:  Hospital admission (pt was admitted for DVT at [facility])    Negatives:  Signs/symptoms of thrombosis, Signs/symptoms of bleeding, Laboratory test error suspected, Change in health, Change in alcohol use, Change in activity, Upcoming invasive procedure, Emergency department visit, Upcoming dental procedure, Missed doses, Extra doses, Change in medications, Change in diet/appetite, Bruising, Other complaints          There were no vitals filed for this visit.   pt declined vitals    Verified current warfarin dosing schedule.    Medications reconciled   Pt is not on antiplatelet therapy      A/P   INR  sub-therapeutic.     Warfarin dosing recommendation:     Pt unable to afford DOAC. She was discharged from [facility] on Lovenox 100 mg ONCE daily (should be 1mg/kg BID). Discussed underdosing of Lovenox increases recurrent VTE but pt wishes to switch to 1.5 mg/kg daily if she needs to continue on Lovenox after next INR check    Pt to restart warfarin 5 mg daily    Pt educated to contact our clinic with any changes in medications or s/s of bleeding or thrombosis. Pt is  aware to seek immediate medical attention for falls, head injury or deep cuts.    Follow up appointment in 2 day(s) via LAB per pt preference.    Riana Rodriguez, PharmD

## 2022-07-07 LAB — INR BLD: 1.1 (ref 0.9–1.2)

## 2022-07-08 ENCOUNTER — DOCUMENTATION (OUTPATIENT)
Dept: VASCULAR LAB | Facility: MEDICAL CENTER | Age: 37
End: 2022-07-08
Payer: COMMERCIAL

## 2022-07-08 NOTE — PROGRESS NOTES
[facility] [place] [facility]      Called the patient today to follow up with her and see if she was able to get her INR tested today yet.   No answer, so LVM for the patient requesting she test her INR ASAP if she has not already done so.   Requested she call the clinic with any questions or concerns or if she is not able to test today.   Will await results or call back from the patient.       Cr MelgarD

## 2022-07-11 ENCOUNTER — DOCUMENTATION (OUTPATIENT)
Dept: VASCULAR LAB | Facility: MEDICAL CENTER | Age: 37
End: 2022-07-11
Payer: COMMERCIAL

## 2022-07-11 NOTE — PROGRESS NOTES
[facility] [place] [facility]        Called the patient again today to follow up with her and see if she was able to get her INR tested as we have not received results yet.   No answer, so LVM for the patient requesting she test her INR ASAP if she has not already done so.   Requested she call the clinic with any questions or concerns or to let us know when and where she tested so we can request results.   Will await results or call back from the patient.   MyChart msg sent to patient.       Cr MelgarD

## 2022-07-12 ENCOUNTER — TELEPHONE (OUTPATIENT)
Dept: VASCULAR LAB | Facility: MEDICAL CENTER | Age: 37
End: 2022-07-12
Payer: COMMERCIAL

## 2022-07-14 ENCOUNTER — TELEPHONE (OUTPATIENT)
Dept: VASCULAR LAB | Facility: MEDICAL CENTER | Age: 37
End: 2022-07-14
Payer: COMMERCIAL

## 2022-07-14 NOTE — LETTER
July 14, 2022        Luci Mata  [address] [place] [address]      Dear Luci Mata,    We have been unsuccessful in our attempts to contact you regarding your Anticoagulation Service appointments.  Warfarin is a potent blood-thinning agent that requires frequent monitoring to measure its level in the blood.  If your level becomes too high, you could develop serious, sometimes life-threatening bleeding problems.  If your level becomes too low, life-threatening blood clots or stroke could result.     The last recorded INR that we have on file for you is:  INR   Date Value Ref Range Status   07/06/2022 1.10 (A) 2 - 3.5 Final       To monitor your warfarin effectively, we need to be able to communicate with you.  This is a requirement to be followed by our Service.  If we are unable to contact you through repeated occasions, you are at risk of being discharged from the Anticoagulation Service.     It is extremely important that you have your lab work drawn as soon as possible.  Alternatively, you may schedule an appointment for a fingerstick INR at our clinic.  We are open Monday-Friday 8 am until 5 pm.  You may reach our Service at [phone].    Sincerely,  Sudhir Mcgee, Adrienne, [facility]  Clinic Supervisor  [facility]  Outpatient Anticoagulation Service

## 2022-07-18 ENCOUNTER — DOCUMENTATION (OUTPATIENT)
Dept: VASCULAR LAB | Facility: MEDICAL CENTER | Age: 37
End: 2022-07-18
Payer: COMMERCIAL

## 2022-07-18 NOTE — PROGRESS NOTES
[facility] [place] [facility]      Patient is overdue to have INR checked.   5th call to follow up with the patient.   Both MyChart and [employer]PS letter sent to patient requesting she call the clinic back to update us on her current status and to have INR tested ASAP.   LVM for her again today.       Cr MelgarD

## 2022-07-21 ENCOUNTER — TELEPHONE (OUTPATIENT)
Dept: VASCULAR LAB | Facility: MEDICAL CENTER | Age: 37
End: 2022-07-21
Payer: COMMERCIAL

## 2022-07-23 ENCOUNTER — HOSPITAL ENCOUNTER (OUTPATIENT)
Dept: LAB | Facility: MEDICAL CENTER | Age: 37
End: 2022-07-23
Attending: NURSE PRACTITIONER
Payer: COMMERCIAL

## 2022-07-23 DIAGNOSIS — Z86.718 HX OF DEEP VENOUS THROMBOSIS: ICD-10-CM

## 2022-07-23 DIAGNOSIS — D68.51 HOMOZYGOUS FACTOR V LEIDEN MUTATION (HCC): ICD-10-CM

## 2022-07-23 LAB
INR PPP: 4.67 (ref 0.87–1.13)
PROTHROMBIN TIME: 42.1 SEC (ref 12–14.6)

## 2022-07-23 PROCEDURE — 36415 COLL VENOUS BLD VENIPUNCTURE: CPT

## 2022-07-23 PROCEDURE — 85610 PROTHROMBIN TIME: CPT

## 2022-07-25 ENCOUNTER — ANTICOAGULATION MONITORING (OUTPATIENT)
Dept: MEDICAL GROUP | Facility: PHYSICIAN GROUP | Age: 37
End: 2022-07-25
Payer: COMMERCIAL

## 2022-07-25 DIAGNOSIS — Z86.718 HX OF DEEP VENOUS THROMBOSIS: ICD-10-CM

## 2022-07-25 DIAGNOSIS — Z79.01 WARFARIN ANTICOAGULATION: ICD-10-CM

## 2022-07-25 DIAGNOSIS — D68.51 HOMOZYGOUS FACTOR V LEIDEN MUTATION (HCC): ICD-10-CM

## 2022-07-25 NOTE — PROGRESS NOTES
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      Will have pt STOP Lovenox if she hasn't already, then SKIP dose of warfarin today of  then reduce warfarin dosing regimen.     Requested pt contact the clinic for any s/s of unusual bleeding, bruising, clotting or any changes to diet or medication.    FU INR in 1 week(s).    Riana Rdoriguez, PharmD

## 2022-08-25 ENCOUNTER — TELEPHONE (OUTPATIENT)
Dept: VASCULAR LAB | Facility: MEDICAL CENTER | Age: 37
End: 2022-08-25
Payer: COMMERCIAL

## 2022-08-26 NOTE — TELEPHONE ENCOUNTER
[facility]    Left a message to remind pt to obtain next INR.    Sudhir Mcgee, PharmD

## 2023-01-23 ENCOUNTER — DOCUMENTATION (OUTPATIENT)
Dept: VASCULAR LAB | Facility: MEDICAL CENTER | Age: 38
End: 2023-01-23
Payer: COMMERCIAL

## 2023-01-23 NOTE — PROGRESS NOTES
[facility] [place] [facility]      Pt is over due for PT/INR for warfarin monitoring.   Left message for patient to have INR checked ASAP.      Will send the patient a for; to (do) [place] message as well.    Clinic phone number left for any questions or concerns.    Cr King  PharmD

## 2023-05-11 ENCOUNTER — OFFICE VISIT (OUTPATIENT)
Dept: URGENT CARE | Facility: PHYSICIAN GROUP | Age: 38
End: 2023-05-11
Payer: COMMERCIAL

## 2023-05-11 VITALS
WEIGHT: 211 LBS | RESPIRATION RATE: 16 BRPM | OXYGEN SATURATION: 98 % | SYSTOLIC BLOOD PRESSURE: 98 MMHG | TEMPERATURE: 97.8 F | BODY MASS INDEX: 33.91 KG/M2 | HEIGHT: 66 IN | DIASTOLIC BLOOD PRESSURE: 58 MMHG | HEART RATE: 81 BPM

## 2023-05-11 DIAGNOSIS — H66.002 NON-RECURRENT ACUTE SUPPURATIVE OTITIS MEDIA OF LEFT EAR WITHOUT SPONTANEOUS RUPTURE OF TYMPANIC MEMBRANE: Primary | ICD-10-CM

## 2023-05-11 DIAGNOSIS — R05.9 COUGH IN ADULT PATIENT: ICD-10-CM

## 2023-05-11 PROCEDURE — 3074F SYST BP LT 130 MM HG: CPT | Performed by: NURSE PRACTITIONER

## 2023-05-11 PROCEDURE — 3078F DIAST BP <80 MM HG: CPT | Performed by: NURSE PRACTITIONER

## 2023-05-11 PROCEDURE — 1126F AMNT PAIN NOTED NONE PRSNT: CPT | Performed by: NURSE PRACTITIONER

## 2023-05-11 PROCEDURE — 99213 OFFICE O/P EST LOW 20 MIN: CPT | Performed by: NURSE PRACTITIONER

## 2023-05-11 RX ORDER — PREDNISONE 20 MG/1
20 TABLET ORAL DAILY
Qty: 5 TABLET | Refills: 0 | Status: SHIPPED | OUTPATIENT
Start: 2023-05-11 | End: 2023-05-16

## 2023-05-11 RX ORDER — AMOXICILLIN AND CLAVULANATE POTASSIUM 875; 125 MG/1; MG/1
1 TABLET, FILM COATED ORAL 2 TIMES DAILY
Qty: 14 TABLET | Refills: 0 | Status: SHIPPED | OUTPATIENT
Start: 2023-05-11 | End: 2023-05-18

## 2023-05-11 RX ORDER — DEXTROMETHORPHAN HYDROBROMIDE AND PROMETHAZINE HYDROCHLORIDE 15; 6.25 MG/5ML; MG/5ML
5 SYRUP ORAL EVERY 4 HOURS PRN
Qty: 120 ML | Refills: 0 | Status: SHIPPED | OUTPATIENT
Start: 2023-05-11 | End: 2023-05-18

## 2023-05-11 ASSESSMENT — ENCOUNTER SYMPTOMS
SPUTUM PRODUCTION: 1
SHORTNESS OF BREATH: 1
SINUS PAIN: 1
COUGH: 1

## 2023-05-12 ENCOUNTER — TELEPHONE (OUTPATIENT)
Dept: VASCULAR LAB | Facility: MEDICAL CENTER | Age: 38
End: 2023-05-12
Payer: COMMERCIAL

## 2023-05-12 NOTE — PROGRESS NOTES
Subjective:     Luci Mata is a 38 y.o. female who presents for Ear Fullness (xSaturday, getting worse, Both L and R, had a sinus infection, pressure, can't heard loud things )      Ear Fullness  Associated symptoms include congestion and coughing.     Pt presents for evaluation of a new problem. uLci is a very pleasant 30-year-old female presents to urgent care today with complaints of an ongoing URI that started 8 days ago.  She is now complaining of left-sided ear pain and crackling.  She does endorse discomfort in her throat and severe cough.  She states that she is coughing to the point where she is starting to gag herself.  She continues to have congestion and left-sided sinus pressure.  She has been using Delsym for her cough at night.  This has not provided any relief.  She did take a COVID test at home which was negative.    Review of Systems   Constitutional:  Positive for malaise/fatigue.   HENT:  Positive for congestion, ear pain, hearing loss and sinus pain.    Respiratory:  Positive for cough, sputum production and shortness of breath.        PMH:   Past Medical History:   Diagnosis Date    Hemorrhagic disorder (HCC)      ALLERGIES: No Known Allergies  SURGHX:   Past Surgical History:   Procedure Laterality Date    [place] NJX AA&/STRD TFRML EPI LUMBAR/SACRAL 1 LEVEL Bilateral 1/25/2022    Procedure: BILATERAL L4-5 transforaminal epidural steroid injection with fluoroscopic guidance;  Surgeon: Rodolfo Luna M.D.;  Location[facility];  Service: Pain Management     SOCHX:   Social History     Socioeconomic History    Marital status:    Tobacco Use    Smoking status: Never    Smokeless tobacco: Never   Vaping Use    Vaping Use: Never used   Substance and Sexual Activity    Alcohol use: Yes    Drug use: Never     FH: No family history on file.      Objective:   BP 98/58 (BP Location: Right arm, Patient Position: Sitting, BP Cuff Size: Adult)   Pulse 81   Temp 36.6 °C  "(97.8 °F) (Temporal)   Resp 16   Ht 1.676 m (5' 6\")   Wt 95.7 kg (211 lb)   SpO2 98%   BMI 34.06 kg/m²     Physical Exam  Vitals and nursing note reviewed.   Constitutional:       General: She is not in acute distress.     Appearance: Normal appearance. She is ill-appearing.   HENT:      Head: Normocephalic and atraumatic.      Right Ear: External ear normal.      Left Ear: External ear normal. A middle ear effusion is present. Tympanic membrane is erythematous and bulging.      Nose: Congestion present. No rhinorrhea.      Mouth/Throat:      Mouth: Mucous membranes are moist.   Eyes:      Extraocular Movements: Extraocular movements intact.      Pupils: Pupils are equal, round, and reactive to light.   Cardiovascular:      Rate and Rhythm: Normal rate and regular rhythm.      Pulses: Normal pulses.      Heart sounds: Normal heart sounds.   Pulmonary:      Effort: Pulmonary effort is normal. No respiratory distress.      Breath sounds: Normal breath sounds. No stridor. No wheezing, rhonchi or rales.   Chest:      Chest wall: No tenderness.   Abdominal:      General: Abdomen is flat. Bowel sounds are normal.      Palpations: Abdomen is soft.      Tenderness: There is no abdominal tenderness. There is no right CVA tenderness or left CVA tenderness.   Musculoskeletal:         General: Normal range of motion.      Cervical back: Normal range of motion and neck supple.   Lymphadenopathy:      Cervical: Cervical adenopathy present.   Skin:     General: Skin is warm and dry.      Capillary Refill: Capillary refill takes less than 2 seconds.   Neurological:      General: No focal deficit present.      Mental Status: She is alert and oriented to person, place, and time. Mental status is at baseline.   Psychiatric:         Mood and Affect: Mood normal.         Behavior: Behavior normal.         Thought Content: Thought content normal.         Judgment: Judgment normal.         Assessment/Plan:   Assessment    1. " Non-recurrent acute suppurative otitis media of left ear without spontaneous rupture of tympanic membrane  amoxicillin-clavulanate (AUGMENTIN) 875-125 MG Tab      2. Cough in adult patient  predniSONE (DELTASONE) 20 MG Tab    promethazine-dextromethorphan (PROMETHAZINE-DM) 6.25-15 MG/5ML syrup        We discussed supportive measures including humidifier, warm salt water gargles, over-the-counter Cepacol throat lozenges, rest  and increased fluids. Pt was encouraged to seek treatment back in the ER or urgent care for worsening symptoms,  fever greater than 100.5, wheezes or shortness of breath.    AVS handout given and reviewed with patient. Pt educated on red flags and when to seek treatment back in ER or UC.

## 2023-05-12 NOTE — TELEPHONE ENCOUNTER
Alerted from APRN that patient is being started on prednisone. Patient is overdue for INR. Last INR 7/25/23. Left patient voicemail to have INR drawn ASAP and return call to clinic with any questions.     Lily King, RamónD

## 2023-07-07 DIAGNOSIS — Z86.718 HX OF DEEP VENOUS THROMBOSIS: ICD-10-CM

## 2023-07-13 ENCOUNTER — DOCUMENTATION (OUTPATIENT)
Dept: VASCULAR LAB | Facility: MEDICAL CENTER | Age: 38
End: 2023-07-13
Payer: COMMERCIAL

## 2023-07-14 NOTE — PROGRESS NOTES
Pt no showed her INR appt today. Called pt. Left vm asking her to call us as soon as she can at [phone] to reschedule.    Halima RAWLS

## 2023-09-27 ENCOUNTER — OFFICE VISIT (OUTPATIENT)
Dept: URGENT CARE | Facility: PHYSICIAN GROUP | Age: 38
End: 2023-09-27
Payer: COMMERCIAL

## 2023-09-27 ENCOUNTER — HOSPITAL ENCOUNTER (OUTPATIENT)
Dept: RADIOLOGY | Facility: MEDICAL CENTER | Age: 38
End: 2023-09-27
Attending: STUDENT IN AN ORGANIZED HEALTH CARE EDUCATION/TRAINING PROGRAM
Payer: COMMERCIAL

## 2023-09-27 VITALS
BODY MASS INDEX: 32.95 KG/M2 | HEIGHT: 66 IN | WEIGHT: 205 LBS | HEART RATE: 80 BPM | OXYGEN SATURATION: 98 % | TEMPERATURE: 97.5 F | RESPIRATION RATE: 16 BRPM | SYSTOLIC BLOOD PRESSURE: 102 MMHG | DIASTOLIC BLOOD PRESSURE: 60 MMHG

## 2023-09-27 DIAGNOSIS — M79.662 PAIN OF LEFT LOWER LEG: ICD-10-CM

## 2023-09-27 DIAGNOSIS — I82.452 ACUTE DEEP VEIN THROMBOSIS (DVT) OF LEFT PERONEAL VEIN (HCC): ICD-10-CM

## 2023-09-27 PROCEDURE — 99214 OFFICE O/P EST MOD 30 MIN: CPT | Performed by: STUDENT IN AN ORGANIZED HEALTH CARE EDUCATION/TRAINING PROGRAM

## 2023-09-27 PROCEDURE — 3078F DIAST BP <80 MM HG: CPT | Performed by: STUDENT IN AN ORGANIZED HEALTH CARE EDUCATION/TRAINING PROGRAM

## 2023-09-27 PROCEDURE — 3074F SYST BP LT 130 MM HG: CPT | Performed by: STUDENT IN AN ORGANIZED HEALTH CARE EDUCATION/TRAINING PROGRAM

## 2023-09-27 PROCEDURE — 93971 EXTREMITY STUDY: CPT | Mod: LT

## 2023-09-27 RX ORDER — RIVAROXABAN 15 MG-20MG
KIT ORAL
Qty: 1 EACH | Refills: 0 | Status: SHIPPED
Start: 2023-09-28 | End: 2023-09-28

## 2023-09-27 ASSESSMENT — ENCOUNTER SYMPTOMS
HEADACHES: 0
NUMBNESS: 0
FOCAL WEAKNESS: 0
COUGH: 0
LEG PAIN: 1
WEAKNESS: 0
WHEEZING: 0
PALPITATIONS: 0
FEVER: 0
CHILLS: 0

## 2023-09-27 NOTE — PROGRESS NOTES
"Subjective     Luci Mata is a 38 y.o. female who presents with Leg Pain (L leg pain, hx of blood clots )            Luci is a 38 y.o. female who presents to urgent care for left lower leg pain.  Patient states left lower leg pain is localized to medial aspect of left ankle and up into mid calf.  She has noticed some slight swelling.  No redness or change in temperature.  No injury or trauma.  Patient was at a concert last night and symptoms started this morning. Patient is leaving for out of the country and is concerned for blood clot.  Patient has had history of DVT in the past which is why she is concerned.      Leg Pain  This is a new problem. The current episode started today. The problem has been unchanged. Pertinent negatives include no chest pain, chills, coughing, fever, headaches, numbness or weakness.       Review of Systems   Constitutional:  Negative for chills, fever and malaise/fatigue.   Respiratory:  Negative for cough and wheezing.    Cardiovascular:  Negative for chest pain and palpitations.   Neurological:  Negative for focal weakness, weakness, numbness and headaches.   All other systems reviewed and are negative.             Objective     /60 (BP Location: Right arm, Patient Position: Sitting, BP Cuff Size: Adult)   Pulse 80   Temp 36.4 °C (97.5 °F) (Temporal)   Resp 16   Ht 1.676 m (5' 6\")   Wt 93 kg (205 lb)   SpO2 98%   BMI 33.09 kg/m²      Physical Exam  Vitals reviewed.   Constitutional:       General: She is not in acute distress.     Appearance: Normal appearance. She is not toxic-appearing.   HENT:      Head: Normocephalic and atraumatic.      Nose: Nose normal.   Eyes:      Extraocular Movements: Extraocular movements intact.      Conjunctiva/sclera: Conjunctivae normal.      Pupils: Pupils are equal, round, and reactive to light.   Cardiovascular:      Rate and Rhythm: Normal rate.   Pulmonary:      Effort: Pulmonary effort is normal.   Musculoskeletal:        "  General: Normal range of motion.      Right knee: Normal.      Left knee: Normal.      Right lower leg: Normal. No edema.      Left lower leg: Swelling (minimal) present. No tenderness or bony tenderness. No edema.      Right ankle: Normal.      Left ankle: Normal.      Right foot: Normal.      Left foot: Normal.   Skin:     General: Skin is warm and dry.   Neurological:      General: No focal deficit present.      Mental Status: She is alert. Mental status is at baseline.                RADIOLOGY RESULTS   US-EXTREMITY VENOUS LOWER UNILAT LEFT    Result Date: 9/27/2023 9/27/2023 10:54 AM HISTORY/REASON FOR EXAM:  Left lower extremity pain and swelling TECHNIQUE/EXAM DESCRIPTION: Left lower extremity Doppler venous ultrasound was performed. Using both color and pulse-wave Doppler imaging, multiple images were obtained from the common femoral vein origins distally through the popliteal trifurcations. COMPARISON:  None. FINDINGS: REAL-TIME GRAY-SCALE IMAGING: Real-time gray-scale imaging reveals no evidence of focal wall thickening. COLOR AND DUPLEX DOPPLER IMAGING: Graded compression was used to demonstrate patent lumens. There is incompressibility of the posterior tibial and peroneal veins in the left lower extremity consistent with acute DVT. There is normal response to augmentation and respiratory variation. There are no abnormal fluid collections.     1. Acute DVT in the left posterior tibial and peroneal veins. 2.  There are no incidental findings. Findings were conveyed to JAMIA LESTER on 9/27/2023 11:18 AM.                   Assessment & Plan          1. Acute deep vein thrombosis (DVT) of left peroneal vein (HCC)  - rivaroxaban (Xarelto) tablet 15 mg  - Referral to Anticoagulation Monitoring    2. Pain of left lower leg  - Undiagnosed new problem with uncertain prognosis  - US-EXTREMITY VENOUS LOWER UNILAT LEFT; Future   - US scheduled today at 11:15 am as outpatient.    US-EXTREMITY VENOUS LOWER UNILAT  LEFT: 1. Acute DVT in the left posterior tibial and peroneal veins. 2.  There are no incidental findings.    Contacted patient regarding US results.  Xarelto dose given in clinic today.  Appointment with [facility] clinic tomorrow.    My total time spent caring for the patient on the day of the encounter was 35 minutes including obtaining patient history, physical exam, discussing plan of care, supportive care measures, appropriate follow-up and indications for immediate follow-up. This does not include time spent on separately billable procedures/tests.     Instructed to return to urgent care or nearest emergency department if symptoms fail to improve, for any change in condition, further concerns, or new concerning symptoms.    Patient states understanding and agrees with the plan of care and discharge instructions.

## 2023-09-28 ENCOUNTER — ANTICOAGULATION VISIT (OUTPATIENT)
Dept: MEDICAL GROUP | Facility: MEDICAL CENTER | Age: 38
End: 2023-09-28
Payer: COMMERCIAL

## 2023-09-28 DIAGNOSIS — D68.51 HOMOZYGOUS FACTOR V LEIDEN MUTATION (HCC): ICD-10-CM

## 2023-09-28 DIAGNOSIS — Z79.01 WARFARIN ANTICOAGULATION: ICD-10-CM

## 2023-09-28 DIAGNOSIS — Z86.718 HX OF DEEP VENOUS THROMBOSIS: ICD-10-CM

## 2023-09-28 PROCEDURE — 99211 OFF/OP EST MAY X REQ PHY/QHP: CPT | Performed by: STUDENT IN AN ORGANIZED HEALTH CARE EDUCATION/TRAINING PROGRAM

## 2023-09-28 NOTE — PROGRESS NOTES
"NEW DOAC   .  Anticoagulation Summary  As of 9/28/2023      INR goal:     TTR:  0.0 % (1 wk)   Prior goal:  2.0-3.0   INR used for dosing:  No new INR was available at the time of this encounter.   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]  Hx of deep venous thrombosis [Z86.718]  Homozygous Factor V Leiden mutation (HCC) [D68.51]                 Anticoagulation Episode Summary       INR check location:      Preferred lab:      Send INR reminders to:      Comments:  7/6/22 DOAC unaffordable          Anticoagulation Care Providers       Provider Role Specialty Phone number    [facility]   [phone]          Anticoagulation Patient Findings      PCP: TONY Anguiano  Cardiologist: None  Dx: LLE DVT  CHADSVASC = N/a  HAS-BLED = N/a  Target End Date = Indefinite - pt w/ FVL w/ hx of VTE    Pt Hx: Pt is not new to [facility] - she followed w/ us previously while on warfarin (last INR f/u noted on 7/25/22), but appears to have been lost to f/u (did not respond to multiple outreach attempts by our clinic). Pt w/ FVL and hx of VTE. Pt went w/out warfarin for some time (pt cannot recall exact amount of time) d/t expensive copays for clinic visits and lab f/u.    Pt presented to  on 9/27/23 d/t LLE pain and swelling. Her provider ordered a stat US which was positive for new DVT (Acute DVT in the left posterior tibial and peroneal veins.). Pt subsequently Rx'd DOAC.    Labs:  No results found for: \"WBC\", \"RBC\", \"HEMOGLOBIN\", \"HEMATOCRIT\", \"MCV\", \"MCH\", \"MCHC\", \"MPV\", \"NEUTSPOLYS\", \"LYMPHOCYTES\", \"MONOCYTES\", \"EOSINOPHILS\", \"BASOPHILS\", \"HYPOCHROMIA\", \"ANISOCYTOSIS\"   No results found for: \"SODIUM\", \"POTASSIUM\", \"CHLORIDE\", \"CO2\", \"GLUCOSE\", \"BUN\", " "\"CREATININE\", \"BUNCREATRAT\", \"GLOMRATE\"       Pt is new to Xarelto and new to [facility]. Discussed:   Indication for DOAC therapy.  Importance of monitoring and compliance.   Monitoring parameters, signs and symptoms of bleeding or clotting.  DOAC therapy, side effects, potential DDIs, OTC medications  Hormonal therapy - None  Pregnancy - Pt denies further pregnancy  DDI - No DDI noted  Pt is NOT on antiplatelet/NSAID therapy.  Lifestyle safety, ie smoking, ETOH, hobby safety, fall safety/prevention  Procedures for missed doses or suspected missed doses, surgeries/procedures, travel, dental work, any medication changes    Start with Xarelto 15mg taken 2 times a day for 21 days and then change to 20mg taken once daily. Pt will transition to 20mg dose on 10/19/23    DOAC affordable = TBD - provided her w/ free 30 day trial coupon and  copay card. Provided our contact information should she have any issues obtaining the medication.    Samples provided: No    Labs to be completed prior to next f/u - CBC, CMP    F/U - 1 month    Gabino Wood, PharmD, BCACP      Added [facility] to care team   CC: Dr. Bloch      "

## 2023-09-29 NOTE — PROGRESS NOTES
Called and confirmed w/ pt  that she was able to p/u Xarelto.    F/u as scheduled.    Gabino Wood, PharmD, BCACP

## 2023-11-06 ENCOUNTER — TELEPHONE (OUTPATIENT)
Dept: VASCULAR LAB | Facility: MEDICAL CENTER | Age: 38
End: 2023-11-06
Payer: COMMERCIAL

## 2023-11-06 NOTE — TELEPHONE ENCOUNTER
Pt is overdue for anticoagulation appt.    Left message for pt to reschedule appt.    2nd call    Discharge criteria:  3 calls (including at least once to emergency contacts)? No   1 letter (with the third call)? No   Over 3 months (last seen 9/28/23, if no response by 12/28 , will d/c from clinic)? No     Riana Rodriguez, PharmD

## 2023-11-20 ENCOUNTER — TELEPHONE (OUTPATIENT)
Dept: VASCULAR LAB | Facility: MEDICAL CENTER | Age: 38
End: 2023-11-20
Payer: COMMERCIAL

## 2023-11-20 NOTE — TELEPHONE ENCOUNTER
Pt is overdue for anticoagulation appt.    Left message for pt to reschedule appt.    2nd call  (1st call on 11/06/23, MCM sent 10/26/23)    Discharge criteria:  3 calls (including at least once to emergency contacts)? No   1 letter (with the third call)? No   Over 3 months (last seen 9/28/23, if no response by 12/28 , will d/c from clinic)? No     Yani Anglin, RamónD

## 2023-12-04 ENCOUNTER — TELEPHONE (OUTPATIENT)
Dept: VASCULAR LAB | Facility: MEDICAL CENTER | Age: 38
End: 2023-12-04
Payer: COMMERCIAL

## 2023-12-04 NOTE — TELEPHONE ENCOUNTER
Pt is overdue for anticoagulation appt.     Left message for pt to reschedule appt.     3rd call  Sent letter     Discharge criteria:  3 calls (including at least once to emergency contacts)? Yes  1 letter (with the third call)? Yes  Over 3 months (last seen 9/28/23, if no response by 12/28 , will d/c from clinic)? No    Riana Rodriguez, PharmD

## 2023-12-04 NOTE — LETTER
December 4, 2023          Luci Mata  [address]  [place] [address]      Dear Luci Mata ,    We have been unsuccessful in our attempts to contact you regarding your Anticoagulation Service appointments. Xarelto is a potent blood-thinning agent that requires monitoring to ensure that the dosage is correct for your body.  If it isn't, you could develop serious, sometimes life-threatening bleeding problems or life-threatening blood clots or stroke could result.    To monitor you effectively, we need to be able to communicate with you.  This is a requirement to be followed by our Service.       If you repeatedly fail to keep your lab appointments, you are at risk of being discharged from the Anticoagulation Service.    It is extremely important that you contact the clinic as soon as possible to arrange appropriate follow up.  We are open Monday-Friday 8 am until 5 pm.  You may reach our Service at [phone].           Sincerely,           Sudhir Mcgee PharmD, [facility]  Clinic Supervisor  [facility]  Outpatient Anticoagulation Service

## 2024-02-02 ENCOUNTER — ANTICOAGULATION MONITORING (OUTPATIENT)
Dept: VASCULAR LAB | Facility: MEDICAL CENTER | Age: 39
End: 2024-02-02
Payer: COMMERCIAL

## 2024-02-02 DIAGNOSIS — Z86.718 HX OF DEEP VENOUS THROMBOSIS: ICD-10-CM

## 2024-02-02 DIAGNOSIS — D68.51 HOMOZYGOUS FACTOR V LEIDEN MUTATION (HCC): ICD-10-CM

## 2024-02-02 NOTE — PROGRESS NOTES
Pt is overdue for anticoagulation appt.     Left message for pt to reschedule appt.     4th call  Previously sent letter and MyChart message     Discharge criteria:  3 calls (including at least once to emergency contacts)? Yes  1 letter (with the third call)? Yes  Over 3 months (last seen 9/28/23, if no response by 12/28 , will d/c from clinic)? Yes    Yani Anglin, RamónD, BCACP

## 2024-04-12 ENCOUNTER — OFFICE VISIT (OUTPATIENT)
Dept: URGENT CARE | Facility: PHYSICIAN GROUP | Age: 39
End: 2024-04-12
Payer: COMMERCIAL

## 2024-04-12 VITALS
SYSTOLIC BLOOD PRESSURE: 112 MMHG | HEART RATE: 60 BPM | BODY MASS INDEX: 33.59 KG/M2 | OXYGEN SATURATION: 98 % | DIASTOLIC BLOOD PRESSURE: 64 MMHG | TEMPERATURE: 98.2 F | WEIGHT: 209 LBS | RESPIRATION RATE: 18 BRPM | HEIGHT: 66 IN

## 2024-04-12 DIAGNOSIS — J22 LRTI (LOWER RESPIRATORY TRACT INFECTION): ICD-10-CM

## 2024-04-12 PROCEDURE — 99214 OFFICE O/P EST MOD 30 MIN: CPT | Performed by: STUDENT IN AN ORGANIZED HEALTH CARE EDUCATION/TRAINING PROGRAM

## 2024-04-12 PROCEDURE — 3074F SYST BP LT 130 MM HG: CPT | Performed by: STUDENT IN AN ORGANIZED HEALTH CARE EDUCATION/TRAINING PROGRAM

## 2024-04-12 PROCEDURE — 3078F DIAST BP <80 MM HG: CPT | Performed by: STUDENT IN AN ORGANIZED HEALTH CARE EDUCATION/TRAINING PROGRAM

## 2024-04-12 RX ORDER — DEXTROMETHORPHAN HYDROBROMIDE AND PROMETHAZINE HYDROCHLORIDE 15; 6.25 MG/5ML; MG/5ML
5 SYRUP ORAL EVERY 4 HOURS PRN
Qty: 120 ML | Refills: 0 | Status: SHIPPED | OUTPATIENT
Start: 2024-04-12

## 2024-04-12 RX ORDER — ALBUTEROL SULFATE 90 UG/1
2 AEROSOL, METERED RESPIRATORY (INHALATION) EVERY 6 HOURS PRN
Qty: 8.5 G | Refills: 0 | Status: SHIPPED | OUTPATIENT
Start: 2024-04-12

## 2024-04-12 RX ORDER — DOXYCYCLINE 100 MG/1
100 CAPSULE ORAL 2 TIMES DAILY
Qty: 10 CAPSULE | Refills: 0 | Status: SHIPPED | OUTPATIENT
Start: 2024-04-12 | End: 2024-04-17

## 2024-04-12 NOTE — PROGRESS NOTES
"Subjective:   Luci Mata is a 39 y.o. female who presents for Cough (X 2 weeks )      HPI:  39-year-old female presents to urgent care for 2 weeks of a initially dry cough that has become more productive.  States that her coughing has been increasing and getting worse.  Was seen in the ER approximately 1 week ago and diagnosed with a viral upper respiratory tract infection.  No current shortness of breath, chest pain, wheezing, nausea, vomiting, dizziness, or headache.      Medications:    albuterol Aers  doxycycline  Paragard Intrauterine Copper Iud  promethazine-dextromethorphan  rivaroxaban Tabs    Allergies: Patient has no known allergies.    Problem List: Luci Mata does not have any pertinent problems on file.    Surgical History:  Past Surgical History:   Procedure Laterality Date    CT NJX AA&/STRD TFRML EPI LUMBAR/SACRAL 1 LEVEL Bilateral 1/25/2022    Procedure: BILATERAL L4-5 transforaminal epidural steroid injection with fluoroscopic guidance;  Surgeon: Rodolfo Luna M.D.;  [facility];  Service: Pain Management       Past Social Hx: Luci Mata  reports that she has never smoked. She has never used smokeless tobacco. She reports current alcohol use. She reports that she does not use drugs.     Past Family Hx:  Luci Mata family history is not on file.     Problem list, medications, and allergies reviewed by myself today in Epic.     Objective:     /64 (BP Location: Right arm, Patient Position: Sitting, BP Cuff Size: Adult)   Pulse 60   Temp 36.8 °C (98.2 °F) (Temporal)   Resp 18   Ht 1.676 m (5' 6\")   Wt 94.8 kg (209 lb)   SpO2 98%   BMI 33.73 kg/m²     Physical Exam  Vitals reviewed.   Cardiovascular:      Rate and Rhythm: Normal rate and regular rhythm.      Pulses: Normal pulses.      Heart sounds: Normal heart sounds. No murmur heard.  Pulmonary:      Effort: Pulmonary effort is normal. No respiratory distress.      " Breath sounds: No stridor. Rhonchi present. No wheezing or rales.   Neurological:      Mental Status: She is alert.         Assessment/Plan:     Diagnosis and associated orders:     1. LRTI (lower respiratory tract infection)  doxycycline (MONODOX) 100 MG capsule    albuterol 108 (90 Base) MCG/ACT Aero Soln inhalation aerosol    promethazine-dextromethorphan (PROMETHAZINE-DM) 6.25-15 MG/5ML syrup         Comments/MDM:     Patient's presentation physical exam findings are consistent with lower respiratory tract infection.  Patient was started on course of doxycycline and albuterol inhaler.  Seen-DM sent to the pharmacy to better control her cough.  Vitals are stable.  No fever.  No shortness of breath or chest pain.  Discussed typical timeframe for improvement in symptoms.  Strict ED/return precautions given.         Differential diagnosis, natural history, supportive care, and indications for immediate follow-up discussed.    Advised the patient to follow-up with the primary care physician for recheck, reevaluation, and consideration of further management.    Please note that this dictation was created using voice recognition software. I have made a reasonable attempt to correct obvious errors, but I expect that there are errors of grammar and possibly content that I did not discover before finalizing the note.    Electronically signed by Tonny Howe PA-C.

## 2024-06-10 ENCOUNTER — OFFICE VISIT (OUTPATIENT)
Dept: MEDICAL GROUP | Age: 39
End: 2024-06-10
Payer: COMMERCIAL

## 2024-06-10 VITALS
SYSTOLIC BLOOD PRESSURE: 120 MMHG | HEIGHT: 66 IN | BODY MASS INDEX: 33.59 KG/M2 | TEMPERATURE: 97.7 F | WEIGHT: 209 LBS | HEART RATE: 66 BPM | OXYGEN SATURATION: 97 % | DIASTOLIC BLOOD PRESSURE: 82 MMHG

## 2024-06-10 DIAGNOSIS — M26.623 BILATERAL TEMPOROMANDIBULAR JOINT PAIN: ICD-10-CM

## 2024-06-10 DIAGNOSIS — E66.9 OBESITY (BMI 30-39.9): ICD-10-CM

## 2024-06-10 PROBLEM — Z79.01 WARFARIN ANTICOAGULATION: Status: RESOLVED | Noted: 2021-02-10 | Resolved: 2024-06-10

## 2024-06-10 PROCEDURE — 99214 OFFICE O/P EST MOD 30 MIN: CPT | Performed by: PHYSICIAN ASSISTANT

## 2024-06-10 PROCEDURE — 3079F DIAST BP 80-89 MM HG: CPT | Performed by: PHYSICIAN ASSISTANT

## 2024-06-10 PROCEDURE — 3074F SYST BP LT 130 MM HG: CPT | Performed by: PHYSICIAN ASSISTANT

## 2024-06-10 RX ORDER — CYCLOBENZAPRINE HCL 10 MG
5-10 TABLET ORAL NIGHTLY
Qty: 14 TABLET | Refills: 0 | Status: SHIPPED | OUTPATIENT
Start: 2024-06-10 | End: 2024-06-24

## 2024-06-10 RX ORDER — METHYLPREDNISOLONE 4 MG/1
TABLET ORAL
Qty: 21 TABLET | Refills: 0 | Status: SHIPPED | OUTPATIENT
Start: 2024-06-10

## 2024-06-10 ASSESSMENT — PATIENT HEALTH QUESTIONNAIRE - PHQ9: CLINICAL INTERPRETATION OF PHQ2 SCORE: 0

## 2024-06-10 NOTE — PROGRESS NOTES
cc: Jaw pain    Subjective:     HPI    History of Present Illness  The patient is a 39-year-old female who presents for evaluation of jaw pain.  She is not able to get into her PCP today.    The patient reports experiencing bilateral jaw pain, with the left side being more affected than the right. She recounts a deep dental filling procedure a few months prior, which unfortunately did not yield the desired results. Despite this, she continues to experience sensitivity, albeit not severe. Over the past few weeks, the jaw pain has progressively worsened. She sought chiropractic treatment, which provided some relief. She is currently on anticoagulant therapy and has been taking Tylenol for pain management. Despite taking two Tylenol tablets, the pain persists. A recent dental x-ray revealed a failed filling. She is scheduled to consult a new dentist who specializes in root canals tomorrow. She experiences pain during mastication and a popping sensation on the left side, which intensifies the pain. She denies her mouth getting stuck open. She reports frequent ear pain and headaches, but denies any fevers.  Pain is so intense that she is tearful, she is not sleeping.          Review of systems:  See above.       Current Outpatient Medications:     methylPREDNISolone (MEDROL DOSEPAK) 4 MG Tablet Therapy Pack, As directed on the packaging label., Disp: 21 Tablet, Rfl: 0    cyclobenzaprine (FLEXERIL) 10 mg Tab, Take 0.5-1 Tablets by mouth every evening for 14 days., Disp: 14 Tablet, Rfl: 0    rivaroxaban (XARELTO) 15 MG Tab tablet, Take 1 Tablet by mouth 2 times a day., Disp: 42 Tablet, Rfl: 0    Paragard Intrauterine Copper IUD, by Intrauterine route., Disp: , Rfl:     Allergies, past medical history, past surgical history, family history, social history reviewed and updated    Objective:     Vitals: /82 (BP Location: Left arm, Patient Position: Sitting, BP Cuff Size: Adult long)   Pulse 66   Temp 36.5 °C (97.7 °F)  "(Temporal)   Ht 1.676 m (5' 6\")   Wt 94.8 kg (209 lb)   SpO2 97%   BMI 33.73 kg/m²   General: Alert, pleasant, NAD  HEENT: Normocephalic.  TMs gray bilaterally.  No sinus pressure tenderness.  Normal posterior oropharynx.  No tonsillar enlargement or exudate.  Uvula midline.  Moderate/severe tenderness along the left TMJ, moderate tenderness of the right TMJ.  Neck supple.  No thyromegaly or masses palpated. No cervical or supraclavicular lymphadenopathy. No carotid bruits   Heart: Regular rate and rhythm.  S1 and S2 normal.  No murmurs appreciated.  Respiratory: Normal respiratory effort.  Clear to auscultation bilaterally.  Skin: Warm, dry, no rashes.  Psych:  Affect/mood is normal, judgement is good, memory is intact, grooming is appropriate.    Assessment/Plan:     Luci was seen today for jaw pain.    Diagnoses and all orders for this visit:    Bilateral temporomandibular joint pain  -Has been ongoing for over a month, with progressive worsening.  She cannot take NSAIDs due to being on Xarelto.  Tylenol is not providing much benefit.  At this point in time do feel it would be appropriate to trial Medrol Dosepak.  Given Flexeril to take at night.  Did advise to take nightly for 10 to 14 days if tolerable.  She is following up with dentist tomorrow.  Suggested maybe they can fit her for mouthguard as well.  Referral placed to TMJ clinic in the event pain persist after Medrol Dosepak and Flexeril.  -     Referral to TMJ Pain Clinic  -     methylPREDNISolone (MEDROL DOSEPAK) 4 MG Tablet Therapy Pack; As directed on the packaging label.  -     cyclobenzaprine (FLEXERIL) 10 mg Tab; Take 0.5-1 Tablets by mouth every evening for 14 days.    Obesity (BMI 30-39.9)  -     Patient identified as having weight management issue.  Appropriate orders and counseling given.        Return if symptoms worsen or fail to improve.  "

## 2024-09-12 ENCOUNTER — OFFICE VISIT (OUTPATIENT)
Dept: MEDICAL GROUP | Facility: LAB | Age: 39
End: 2024-09-12
Payer: COMMERCIAL

## 2024-09-12 VITALS
TEMPERATURE: 98 F | SYSTOLIC BLOOD PRESSURE: 116 MMHG | HEART RATE: 62 BPM | OXYGEN SATURATION: 98 % | HEIGHT: 66 IN | DIASTOLIC BLOOD PRESSURE: 68 MMHG | BODY MASS INDEX: 33.78 KG/M2 | RESPIRATION RATE: 14 BRPM | WEIGHT: 210.2 LBS

## 2024-09-12 DIAGNOSIS — B37.31 YEAST VAGINITIS: ICD-10-CM

## 2024-09-12 PROCEDURE — 3074F SYST BP LT 130 MM HG: CPT | Performed by: NURSE PRACTITIONER

## 2024-09-12 PROCEDURE — 99213 OFFICE O/P EST LOW 20 MIN: CPT | Performed by: NURSE PRACTITIONER

## 2024-09-12 PROCEDURE — 3078F DIAST BP <80 MM HG: CPT | Performed by: NURSE PRACTITIONER

## 2024-09-12 RX ORDER — FLUCONAZOLE 150 MG/1
150 TABLET ORAL
Qty: 2 TABLET | Refills: 0 | Status: SHIPPED | OUTPATIENT
Start: 2024-09-12

## 2024-09-12 NOTE — PROGRESS NOTES
"Chief Complaint   Patient presents with    Vaginal Discharge     Itching / cramping , white discharge  x 2 days      Verbal consent was acquired by the patient to use [email or web address] ambient listening note generation during this visit Yes      HPI:  History of Present Illness  The patient presents for evaluation of vaginal itching and discharge.    Yeast infection  She began experiencing symptoms a few days ago, including itching and discharge. She sought over-the-counter relief with Monistat from [facility] yesterday, but her condition worsened. Today, she reports abdominal cramping and discomfort, along with painful urination, which she describes as a burning sensation.     She has no history of yeast infections and reports no increase in urinary frequency or urgency. There is no presence of blood in her urine. Her last menstrual period was a few weeks ago, and she is not currently pregnant. She reports no foul odor or recent antibiotic use. She also does not endorse the possibility of sexually transmitted diseases.    ROS:  As documented in history of present illness above    Exam:   /68 (BP Location: Right arm, Patient Position: Sitting, BP Cuff Size: Adult)   Pulse 62   Temp 36.7 °C (98 °F)   Resp 14   Ht 1.676 m (5' 6\")   Wt 95.3 kg (210 lb 3.2 oz)   SpO2 98%   BMI 33.93 kg/m²     Constitutional: Alert, no distress, well-groomed.  Skin: Warm, dry, good turgor, no rashes in visible areas.  Eye: Equal, round and reactive, conjunctiva clear, lids normal.  ENMT: Lips without lesions, good dentition, moist mucous membranes.  Neck: Trachea midline, no masses, no thyromegaly.  Respiratory: Unlabored respiratory effort, no cough.  MSK: Normal gait, moves all extremities.  Neuro: Grossly non-focal.   Psych: Alert and oriented x3, normal affect and mood.    Assessment / Plan:  Assessment & Plan  1. Yeast vaginitis  Symptoms include itching, discharge, abdominal cramping, and discomfort during urination, which are " indicative of a yeast infection. She has been prescribed Diflucan 150 mg, with instructions to take one dose today and another dose in three days if symptoms persist.  Discussed testing with vaginal swab, patient declines at this time.

## 2024-12-14 ENCOUNTER — APPOINTMENT (OUTPATIENT)
Dept: URGENT CARE | Facility: CLINIC | Age: 39
End: 2024-12-14
Payer: COMMERCIAL

## 2025-01-03 ENCOUNTER — HOSPITAL ENCOUNTER (OUTPATIENT)
Dept: RADIOLOGY | Facility: MEDICAL CENTER | Age: 40
End: 2025-01-03
Payer: COMMERCIAL

## 2025-01-03 ENCOUNTER — OFFICE VISIT (OUTPATIENT)
Dept: URGENT CARE | Facility: CLINIC | Age: 40
End: 2025-01-03
Payer: COMMERCIAL

## 2025-01-03 VITALS
HEIGHT: 66 IN | TEMPERATURE: 97.2 F | SYSTOLIC BLOOD PRESSURE: 114 MMHG | DIASTOLIC BLOOD PRESSURE: 72 MMHG | RESPIRATION RATE: 18 BRPM | WEIGHT: 210 LBS | HEART RATE: 78 BPM | BODY MASS INDEX: 33.75 KG/M2 | OXYGEN SATURATION: 99 %

## 2025-01-03 DIAGNOSIS — R22.42 LOCALIZED SWELLING OF LEFT LOWER EXTREMITY: ICD-10-CM

## 2025-01-03 PROCEDURE — 3074F SYST BP LT 130 MM HG: CPT

## 2025-01-03 PROCEDURE — 93971 EXTREMITY STUDY: CPT | Mod: LT

## 2025-01-03 PROCEDURE — 99214 OFFICE O/P EST MOD 30 MIN: CPT

## 2025-01-03 PROCEDURE — 3078F DIAST BP <80 MM HG: CPT

## 2025-01-03 ASSESSMENT — PAIN SCALES - GENERAL: PAINLEVEL_OUTOF10: 6=MODERATE PAIN

## 2025-01-03 NOTE — PROGRESS NOTES
"Subjective     Luci Mata is a 39 y.o. female who presents with Foot Injury (Swollen L foot x4 days)            Lesli is here today with complaints of left foot and ankle pain and swelling.  She notes that this started 4 days ago.  She states that she feels pain when she puts pressure on her left foot and thought it may be related to plantar fasciitis.  However then she noticed some swelling in the medial part of her left ankle and became worried.  She has a history of factor V, multiple DVTs, and a stent placed to prevent further clots.  She does not recall any injury or trauma to the area.  She has tried taking Tylenol which has minimally helped.        Review of Systems   Constitutional:  Negative for fever and malaise/fatigue.   HENT:  Negative for congestion.    Respiratory:  Negative for cough and shortness of breath.    Cardiovascular:  Negative for chest pain.   Gastrointestinal:  Negative for diarrhea, nausea and vomiting.   Musculoskeletal:         Left foot and ankle pain   Skin:  Negative for rash.   Neurological:  Negative for dizziness and weakness.   All other systems reviewed and are negative.             Objective     /72 (BP Location: Left arm, Patient Position: Sitting, BP Cuff Size: Adult)   Pulse 78   Temp 36.2 °C (97.2 °F) (Temporal)   Resp 18   Ht 1.676 m (5' 6\")   Wt 95.3 kg (210 lb)   SpO2 99%   BMI 33.89 kg/m²      Physical Exam  Vitals reviewed.   Constitutional:       Appearance: Normal appearance.   HENT:      Head: Normocephalic.      Nose: Nose normal.   Eyes:      Extraocular Movements: Extraocular movements intact.      Conjunctiva/sclera: Conjunctivae normal.   Cardiovascular:      Rate and Rhythm: Normal rate and regular rhythm.   Pulmonary:      Effort: Pulmonary effort is normal. No respiratory distress.      Breath sounds: Normal breath sounds.   Musculoskeletal:         General: Normal range of motion.      Right ankle: Normal.      Left ankle: Swelling " present. No deformity, ecchymosis or lacerations. Tenderness present. Normal range of motion. Normal pulse.      Left Achilles Tendon: Normal.      Comments: Mild edema below medial malleolus which is tender to palpation.  No erythema or warmth.   Skin:     General: Skin is warm and dry.   Neurological:      Mental Status: She is alert and oriented to person, place, and time.   Psychiatric:         Behavior: Behavior normal.                   Assessment & Plan        Assessment & Plan  Localized swelling of left lower extremity    Orders:    US-EXTREMITY VENOUS LOWER UNILAT LEFT; Future   TECHNIQUE/EXAM DESCRIPTION:  Using both color and pulsed-wave Doppler imaging, multiple images were obtained of the left lower extremity from the common femoral vein origin distally through the popliteal trifurcation.  Graded compression was used to demonstrate a patent lumen.     COMPARISON:  Left lower extremity venous duplex Doppler sonography, 9/27/2023.     FINDINGS:     REAL-TIME GRAY-SCALE IMAGING:  Real-time gray-scale imaging reveals no evidence of focal wall thickening.     COLOR AND DUPLEX DOPPLER IMAGING:  There is no evidence of luminal thrombus.  There is normal augmentation to flow and respiratory variation.     IMPRESSION:     No evidence of left lower extremity deep venous thrombosis.        Given her history of factor V and multiple DVTs as well as ankle swelling with no known injury or trauma a venous duplex was ordered of her left leg.  This was completed the same day did not show any evidence of left leg DVT.  Results were communicated with her via [facility].    Encouraged her to rest her ankle and foot and take Tylenol as well as use heat or ice as needed for pain.  Plantar fasciitis stretches attached to MyChart.  Encourage supportive footwear.    Discussed ER precautions such as chest pain, tachycardia, dizziness, shortness of breath, or difficulty breathing.

## 2025-01-03 NOTE — LETTER
January 3, 2025    To Whom It May Concern:         This is confirmation that Luci Annzier attended her scheduled appointment with TONY Sibley on 1/03/25. Please excuse her from work or allow her light duty on 1/6/25 if her symptoms don't improve.    Sincerely,          SALVADOR Sibley.  [phone]

## 2025-01-06 ASSESSMENT — ENCOUNTER SYMPTOMS
FEVER: 0
DIARRHEA: 0
VOMITING: 0
NAUSEA: 0
WEAKNESS: 0
COUGH: 0
DIZZINESS: 0
SHORTNESS OF BREATH: 0

## 2025-01-29 ENCOUNTER — OFFICE VISIT (OUTPATIENT)
Dept: URGENT CARE | Facility: CLINIC | Age: 40
End: 2025-01-29
Payer: COMMERCIAL

## 2025-01-29 VITALS
HEIGHT: 66 IN | OXYGEN SATURATION: 96 % | BODY MASS INDEX: 33.75 KG/M2 | RESPIRATION RATE: 18 BRPM | DIASTOLIC BLOOD PRESSURE: 68 MMHG | SYSTOLIC BLOOD PRESSURE: 114 MMHG | TEMPERATURE: 97.9 F | WEIGHT: 210 LBS | HEART RATE: 70 BPM

## 2025-01-29 DIAGNOSIS — L03.119 CELLULITIS OF FOOT: Primary | ICD-10-CM

## 2025-01-29 DIAGNOSIS — Z86.718 HX OF DEEP VENOUS THROMBOSIS: ICD-10-CM

## 2025-01-29 PROCEDURE — 99213 OFFICE O/P EST LOW 20 MIN: CPT | Performed by: NURSE PRACTITIONER

## 2025-01-29 PROCEDURE — 3078F DIAST BP <80 MM HG: CPT | Performed by: NURSE PRACTITIONER

## 2025-01-29 PROCEDURE — 3074F SYST BP LT 130 MM HG: CPT | Performed by: NURSE PRACTITIONER

## 2025-01-29 RX ORDER — CEPHALEXIN 500 MG/1
500 CAPSULE ORAL 4 TIMES DAILY
Qty: 20 CAPSULE | Refills: 0 | Status: SHIPPED | OUTPATIENT
Start: 2025-01-29 | End: 2025-02-03

## 2025-01-29 NOTE — PROGRESS NOTES
Luci Mata is a 39 y.o. female who presents for Foot Swelling (Lt foot painful and swelling x 3 days, worsening, redness and swelling has worsened this morning, no known injury )      HPI  This is a new problem. Luci Mata is a 39 y.o. patient who presents to urgent care with c/o: foot swelling Left foot. Painful to touch. It is getting more red. Denies trauma. No known injury. No new shoes.  Tx tried: none.   Approx 10 days without xarelto. Seeing her provider tomorrow.   No other aggravating or alleviating factors.  Denies any other concerns at this time.         ROS See HPI    Allergies:     No Known Allergies    PMSFS Hx:  Past Medical History:   Diagnosis Date    Hemorrhagic disorder (HCC)      Past Surgical History:   Procedure Laterality Date    [place] NJX AA&/STRD TFRML EPI LUMBAR/SACRAL 1 LEVEL Bilateral 1/25/2022    Procedure: BILATERAL L4-5 transforaminal epidural steroid injection with fluoroscopic guidance;  Surgeon: Rodolfo Luna M.D.;  [facility];  Service: Pain Management     History reviewed. No pertinent family history.  Social History     Tobacco Use    Smoking status: Never    Smokeless tobacco: Never   Substance Use Topics    Alcohol use: Not Currently         Problems:   Patient Active Problem List   Diagnosis    Migraine with aura and without status migrainosus, not intractable    Hx of deep venous thrombosis    Homozygous Factor V Leiden mutation (HCC)    Presence of IUD    Vitamin D deficiency    Obesity (BMI 30-39.9)    Thigh numbness       Medications:   Current Outpatient Medications on File Prior to Visit   Medication Sig Dispense Refill    Paragard Intrauterine Copper IUD by Intrauterine route.       No current facility-administered medications on file prior to visit.        Objective:     /68 (BP Location: Left arm, Patient Position: Sitting, BP Cuff Size: Adult)   Pulse 70   Temp 36.6 °C (97.9 °F) (Temporal)   Resp 18   Ht  "1.676 m (5' 6\")   Wt 95.3 kg (210 lb)   SpO2 96%   BMI 33.89 kg/m²     Physical Exam  Vitals reviewed.   Constitutional:       General: She is not in acute distress.     Appearance: Normal appearance. She is normal weight.   Cardiovascular:      Rate and Rhythm: Normal rate.      Pulses: Normal pulses.   Pulmonary:      Effort: Pulmonary effort is normal.   Skin:     General: Skin is warm.      Capillary Refill: Capillary refill takes less than 2 seconds.      Findings: Erythema (hot to touch) present. No bruising, ecchymosis or signs of injury.          Neurological:      Mental Status: She is alert and oriented to person, place, and time.   Psychiatric:         Mood and Affect: Mood normal.         Behavior: Behavior normal.         Assessment /Associated Orders:      1. Cellulitis of foot  cephALEXin (KEFLEX) 500 MG Cap      2. Hx of deep venous thrombosis              Medical Decision Making:    Luci Mata is a very pleasant 39 y.o. female who is clinically stable at today's acute urgent care visit. Presents with acute problem/ concern today.    No acute distress is noted at the time of the visit.  VSS. Appropriate for outpatient care at this time.  No evidence of phlebitis today. There is a small localized area on dorsum of left foot with edema and erythema. Skin hot to touch. + TTP   She was seen in urgent care for left ankle swelling 01/03/2025. Neg US for DVT at that time.   Educated in proper administration of  prescription medication(s) ordered today including safety, possible SE, risks, benefits, rationale and alternatives to therapy.   Epson salt and warm water soaks twice daily to 3 times daily to promote healing    Through shared decision making a discussion of the Dx and DDx, management options (risks,benefits, and alternatives to planned treatment), natural progression and supportive care.  Expressed understanding and the treatment plan was agreed upon.   Questions were encouraged and " answered     Follow Up:   Return to urgent care prn if new or worsening sx or if there is no improvement in condition prn.    Educated in Red flags and indications to immediately call 911 or present to the Emergency Department.         Please note that this dictation was created using voice recognition software. I have worked with consultants from the vendor as well as technical experts from [employer] [facility] to optimize the interface. I have made every reasonable attempt to correct obvious errors, but I expect that there are errors of grammar and possibly content that I did not discover before finalizing the note.  This note was electronically signed by provider

## 2025-02-20 ENCOUNTER — HOSPITAL ENCOUNTER (OUTPATIENT)
Facility: MEDICAL CENTER | Age: 40
End: 2025-02-20
Attending: NURSE PRACTITIONER
Payer: COMMERCIAL

## 2025-02-20 ENCOUNTER — ANTICOAGULATION VISIT (OUTPATIENT)
Dept: MEDICAL GROUP | Facility: MEDICAL CENTER | Age: 40
End: 2025-02-20
Payer: COMMERCIAL

## 2025-02-20 ENCOUNTER — TELEPHONE (OUTPATIENT)
Dept: MEDICAL GROUP | Facility: MEDICAL CENTER | Age: 40
End: 2025-02-20

## 2025-02-20 VITALS
BODY MASS INDEX: 33.75 KG/M2 | DIASTOLIC BLOOD PRESSURE: 70 MMHG | SYSTOLIC BLOOD PRESSURE: 116 MMHG | HEART RATE: 69 BPM | HEIGHT: 66 IN | WEIGHT: 210 LBS

## 2025-02-20 DIAGNOSIS — Z86.718 HX OF DEEP VENOUS THROMBOSIS: ICD-10-CM

## 2025-02-20 DIAGNOSIS — D68.51 HOMOZYGOUS FACTOR V LEIDEN MUTATION (HCC): ICD-10-CM

## 2025-02-20 LAB
ALBUMIN SERPL BCP-MCNC: 4 G/DL (ref 3.2–4.9)
ALBUMIN/GLOB SERPL: 1.3 G/DL
ALP SERPL-CCNC: 74 U/L (ref 30–99)
ALT SERPL-CCNC: 21 U/L (ref 2–50)
ANION GAP SERPL CALC-SCNC: 11 MMOL/L (ref 7–16)
AST SERPL-CCNC: 20 U/L (ref 12–45)
BILIRUB SERPL-MCNC: 0.6 MG/DL (ref 0.1–1.5)
BUN SERPL-MCNC: 12 MG/DL (ref 8–22)
CALCIUM ALBUM COR SERPL-MCNC: 9 MG/DL (ref 8.5–10.5)
CALCIUM SERPL-MCNC: 9 MG/DL (ref 8.5–10.5)
CHLORIDE SERPL-SCNC: 105 MMOL/L (ref 96–112)
CO2 SERPL-SCNC: 25 MMOL/L (ref 20–33)
CREAT SERPL-MCNC: 0.83 MG/DL (ref 0.5–1.4)
ERYTHROCYTE [DISTWIDTH] IN BLOOD BY AUTOMATED COUNT: 44.3 FL (ref 35.9–50)
GFR SERPLBLD CREATININE-BSD FMLA CKD-EPI: 91 ML/MIN/1.73 M 2
GLOBULIN SER CALC-MCNC: 3.2 G/DL (ref 1.9–3.5)
GLUCOSE SERPL-MCNC: 84 MG/DL (ref 65–99)
HCT VFR BLD AUTO: 41 % (ref 37–47)
HGB BLD-MCNC: 13.4 G/DL (ref 12–16)
MCH RBC QN AUTO: 30.4 PG (ref 27–33)
MCHC RBC AUTO-ENTMCNC: 32.7 G/DL (ref 32.2–35.5)
MCV RBC AUTO: 93 FL (ref 81.4–97.8)
PLATELET # BLD AUTO: 360 K/UL (ref 164–446)
PMV BLD AUTO: 10 FL (ref 9–12.9)
POTASSIUM SERPL-SCNC: 4.4 MMOL/L (ref 3.6–5.5)
PROT SERPL-MCNC: 7.2 G/DL (ref 6–8.2)
RBC # BLD AUTO: 4.41 M/UL (ref 4.2–5.4)
SODIUM SERPL-SCNC: 141 MMOL/L (ref 135–145)
WBC # BLD AUTO: 8.9 K/UL (ref 4.8–10.8)

## 2025-02-20 PROCEDURE — 3078F DIAST BP <80 MM HG: CPT | Performed by: NURSE PRACTITIONER

## 2025-02-20 PROCEDURE — 3074F SYST BP LT 130 MM HG: CPT | Performed by: NURSE PRACTITIONER

## 2025-02-20 PROCEDURE — 80053 COMPREHEN METABOLIC PANEL: CPT

## 2025-02-20 PROCEDURE — 99211 OFF/OP EST MAY X REQ PHY/QHP: CPT | Performed by: NURSE PRACTITIONER

## 2025-02-20 PROCEDURE — 36415 COLL VENOUS BLD VENIPUNCTURE: CPT

## 2025-02-20 PROCEDURE — 85027 COMPLETE CBC AUTOMATED: CPT

## 2025-02-20 RX ORDER — WARFARIN SODIUM 5 MG/1
5 TABLET ORAL DAILY
Qty: 45 TABLET | Refills: 0 | Status: SHIPPED | OUTPATIENT
Start: 2025-02-20 | End: 2025-02-20

## 2025-02-20 NOTE — PROGRESS NOTES
"Target end date: Indefinite  Indication: recurrent DVT w/ FVL  Drug: Xarelto 20 mg daily  CHADsVASC = n/a  HAS-BLED = n/a    Health Status Since Last Assessment  Pt denies any new relevant medical problems, ED visits or hospitalizations  Pt denies any embolic events (stroke/tia/systemic embolism)    Adherence with DOAC Therapy  Pt has missed doses in the average week. Has been out of Xarelto for 1 month.    Bleeding Risk Assessment  Pt denies epistaxis  Pt denies any hematuria  Pt denies any excessive or unusual bleeding/hematomas.  Pt denies any GI bleeds or hematemesis.  Pt denies any concerning daily headache or subdural hematoma symptoms.    Latest Hemoglobin:  Patient will get labs done  No results found for: \"HEMOGLOBIN\"  Latest Platelets: WNL  No results found for: \"PLATELETCT\"    Pt denies  ETOH overuse     Creatinine Clearance/Renal Function  Latest CrCl: patient will get labs ml/min    Hepatic function  Latest LFTs: WNL  Pt denies any history of liver dysfunction        Drug Interactions  ASA/other antiplatelets: None  NSAID: None  Other drug interactions: None  Verified no anticonvulsant or azole therapy, education provided for future use.     Examination  Blood Pressure WNL  Vitals:    02/20/25 1049   BP: 116/70   Pulse: 69     Symptomatic hypotension: Denies   Significant gait impairment/imbalance/high risk for falls? None    Final Assessment and Recommendations:  Patient appears stable from the anticoagulation standpoint.    Benefits of continued DOAC therapy outweigh risks for this patient  Recommend pt change to Warfarin - patient cannot afford 340B pricing of $50/month. (Patient previously taking warfarin but provided patient with basic education)  DOAC is not affordable (sent to Rx coordinators to confirm that there is no cost ability to keep patient on DOAC)    Patient to start taking Warfarin 5 mg daily and will follow up in 4 days.     Other Actions: CMP/CBC hemogram ordered prior to next " visit    Follow up:  Will follow up with patient 4 day(s).     Ramón ReyD

## 2025-02-21 NOTE — TELEPHONE ENCOUNTER
----- Message from Thomas VINCENT PhT sent at 2/20/2025  4:44 PM PST -----  Regarding: RE: Can you investigate price of Xarelto/eliquis via patient insurance please?  Arnold Bautista!    Running test claims, it looks like these are the prices as follows:    Xarelto 20mg: $62.31/30ds - or - $112.31/90ds  Eliquis 5mg: $62.31/30ds - or - $112.31/90ds    She is eligible for the copay card which, for Xarelto, could bring her copay down to $10 or a 30 or 90 day supply until she exhausts the copay card benefit, and the same for Eliquis.    Let us know how you'd like to proceed! Thank you!    Andres  ----- Message -----  From: Lily King, PharmD  Sent: 2/20/2025  10:52 AM PST  To: Vascular Pharmacy Coordinator  Subject: Can you investigate price of Xarelto/eliquis#

## 2025-02-21 NOTE — TELEPHONE ENCOUNTER
Called and spoke with patient - she would be able to afford $10/month. Will have pharmacy coordinator follow up to help patient get medication.     Ramón ReyD

## 2025-02-22 ENCOUNTER — PATIENT MESSAGE (OUTPATIENT)
Dept: VASCULAR LAB | Facility: MEDICAL CENTER | Age: 40
End: 2025-02-22
Payer: COMMERCIAL

## 2025-02-22 ENCOUNTER — TELEPHONE (OUTPATIENT)
Dept: VASCULAR LAB | Facility: MEDICAL CENTER | Age: 40
End: 2025-02-22
Payer: COMMERCIAL

## 2025-02-22 NOTE — TELEPHONE ENCOUNTER
Patient Phone Number: [phone]  Medication: XARELTO 20MG TABLETS  (Quantity 90, Day Supply 90)  Copay: $112.31  Household size:NA  Annual Household Adjusted Gross Income: COMMERCIAL INSURED PT   Additional information/consent to FA: YES!     JEAN PAUL Ambrocio, PhT  Vascular Pharmacy Liaison (Rx Coordinator)  P: [phone]  2/22/2025 8:58 AM

## 2025-02-22 NOTE — TELEPHONE ENCOUNTER
Medication: XARELTO 20MG TABLETS  Type of Insurance: Commercial  Type of Financial assistance requested Copay Card  Source: XARELTOWITHME  Source Phone #: [phone]  Outcome: APPROVED   Effective dates: 02/22/2025-02/22/2028  Details/Billing Information:   BIN: [id number]  PCN: [id number]   GRP: [id number]  ID: [id number]    Final Copay: $10 **for maximum of 6 fills at the pharmacy for either 30 or 90DS ; and maximum savings of $113 per fill thereafter.     JEAN PAUL Ambrocio, PhT  Vascular Pharmacy Liaison (Rx Coordinator)  P: [phone]  2/22/2025 9:00 AM

## 2025-02-22 NOTE — TELEPHONE ENCOUNTER
Received New start PA request via  EMR   for rivaroxaban (XARELTO) 20 MG tablets. (Quantity:90, Day Supply:90)     Insurance: RX BC  Member ID:  [id number]  BIN: [id number]  PCN: [id number]  Group: [id number]     Ran Test claim via [place] & medication  pays for $112.31 ; $62.31/30DS copay. Received EMR that pt would like to seek copay assistance to help lower the cost of her medication.     JEAN PAUL Ambrocio, PhT  Vascular Pharmacy Liaison (Rx Coordinator)  P: [phone]  2/22/2025 8:57 AM

## 2025-02-24 ENCOUNTER — OFFICE VISIT (OUTPATIENT)
Dept: URGENT CARE | Facility: CLINIC | Age: 40
End: 2025-02-24
Payer: COMMERCIAL

## 2025-02-24 ENCOUNTER — APPOINTMENT (OUTPATIENT)
Dept: URGENT CARE | Facility: CLINIC | Age: 40
End: 2025-02-24
Payer: COMMERCIAL

## 2025-02-24 VITALS
HEIGHT: 66 IN | RESPIRATION RATE: 19 BRPM | SYSTOLIC BLOOD PRESSURE: 110 MMHG | TEMPERATURE: 97.6 F | OXYGEN SATURATION: 96 % | DIASTOLIC BLOOD PRESSURE: 78 MMHG | BODY MASS INDEX: 34.55 KG/M2 | HEART RATE: 85 BPM | WEIGHT: 215 LBS

## 2025-02-24 DIAGNOSIS — J22 ACUTE RESPIRATORY INFECTION: ICD-10-CM

## 2025-02-24 DIAGNOSIS — R05.1 ACUTE COUGH: ICD-10-CM

## 2025-02-24 DIAGNOSIS — J98.01 BRONCHOSPASM: ICD-10-CM

## 2025-02-24 PROCEDURE — 3078F DIAST BP <80 MM HG: CPT | Performed by: FAMILY MEDICINE

## 2025-02-24 PROCEDURE — 3074F SYST BP LT 130 MM HG: CPT | Performed by: FAMILY MEDICINE

## 2025-02-24 PROCEDURE — 99214 OFFICE O/P EST MOD 30 MIN: CPT | Performed by: FAMILY MEDICINE

## 2025-02-24 RX ORDER — DOXYCYCLINE HYCLATE 100 MG
100 TABLET ORAL 2 TIMES DAILY
Qty: 14 TABLET | Refills: 0 | Status: SHIPPED | OUTPATIENT
Start: 2025-02-24 | End: 2025-03-03

## 2025-02-24 RX ORDER — BENZONATATE 100 MG/1
100 CAPSULE ORAL 3 TIMES DAILY PRN
Qty: 30 CAPSULE | Refills: 0 | Status: SHIPPED | OUTPATIENT
Start: 2025-02-24

## 2025-02-24 RX ORDER — ALBUTEROL SULFATE 90 UG/1
1-2 INHALANT RESPIRATORY (INHALATION) EVERY 4 HOURS PRN
Qty: 1 EACH | Refills: 1 | Status: SHIPPED | OUTPATIENT
Start: 2025-02-24

## 2025-02-24 ASSESSMENT — ENCOUNTER SYMPTOMS
FEVER: 0
DIZZINESS: 0
MYALGIAS: 0
WHEEZING: 1
NAUSEA: 0
CHILLS: 0
COUGH: 1
SORE THROAT: 0
VOMITING: 0
SHORTNESS OF BREATH: 1

## 2025-02-24 ASSESSMENT — FIBROSIS 4 INDEX: FIB4 SCORE: 0.47

## 2025-02-24 NOTE — PROGRESS NOTES
Subjective:   Luci Mata is a 39 y.o. female who presents for Congestion (7 DAYS ) and Cough (7 DAYS)        Cough  This is a new (Reports cough for the past week, productive) problem. The current episode started in the past 7 days. The problem has been unchanged. The cough is Productive of sputum. Associated symptoms include shortness of breath and wheezing. Pertinent negatives include no chills, fever, myalgias, rash or sore throat. Exacerbated by: Reports recent episode of costochondritis with minimal improvement with NSAID. Treatments tried: NSAIDs, acetaminophen. The treatment provided mild relief.     PMH:  has a past medical history of Hemorrhagic disorder (HCC).  MEDS:   Current Outpatient Medications:     doxycycline (VIBRAMYCIN) 100 MG Tab, Take 1 Tablet by mouth 2 times a day for 7 days., Disp: 14 Tablet, Rfl: 0    albuterol 108 (90 Base) MCG/ACT Aero Soln inhalation aerosol, Inhale 1-2 Puffs every four hours as needed for Shortness of Breath., Disp: 1 Each, Rfl: 1    benzonatate (TESSALON) 100 MG Cap, Take 1 Capsule by mouth 3 times a day as needed for Cough., Disp: 30 Capsule, Rfl: 0    Paragard Intrauterine Copper IUD, by Intrauterine route., Disp: , Rfl:     rivaroxaban (XARELTO) 20 MG Tab tablet, Take 1 Tablet by mouth with dinner. (Patient not taking: Reported on 2/24/2025), Disp: 90 Tablet, Rfl: 1  ALLERGIES: No Known Allergies  SURGHX:   Past Surgical History:   Procedure Laterality Date    [place] NJX AA&/STRD TFRML EPI LUMBAR/SACRAL 1 LEVEL Bilateral 1/25/2022    Procedure: BILATERAL L4-5 transforaminal epidural steroid injection with fluoroscopic guidance;  Surgeon: Rodolfo Luna M.D.;  [facility];  Service: Pain Management     SOCHX:  reports that she has never smoked. She has never used smokeless tobacco. She reports that she does not currently use alcohol. She reports that she does not use drugs.  FH: History reviewed. No pertinent family history.  Review  "of Systems   Constitutional:  Negative for chills and fever.   HENT:  Negative for sore throat.    Respiratory:  Positive for cough, shortness of breath and wheezing.    Gastrointestinal:  Negative for nausea and vomiting.   Musculoskeletal:  Negative for myalgias.   Skin:  Negative for rash.   Neurological:  Negative for dizziness.        Objective:   /78   Pulse 85   Temp 36.4 °C (97.6 °F) (Temporal)   Resp 19   Ht 1.676 m (5' 6\")   Wt 97.5 kg (215 lb)   SpO2 96%   BMI 34.70 kg/m²   Physical Exam  Vitals and nursing note reviewed.   Constitutional:       General: She is not in acute distress.     Appearance: She is well-developed.   HENT:      Head: Normocephalic and atraumatic.      Right Ear: External ear normal.      Left Ear: External ear normal.      Nose: Nose normal.      Mouth/Throat:      Mouth: Mucous membranes are moist.   Eyes:      Conjunctiva/sclera: Conjunctivae normal.   Cardiovascular:      Rate and Rhythm: Normal rate.   Pulmonary:      Effort: Pulmonary effort is normal. No respiratory distress.      Breath sounds: Wheezing (Minimal expiratory) present. No rhonchi.   Abdominal:      General: There is no distension.   Musculoskeletal:         General: Normal range of motion.   Skin:     General: Skin is warm and dry.   Neurological:      General: No focal deficit present.      Mental Status: She is alert and oriented to person, place, and time. Mental status is at baseline.      Gait: Gait (gait at baseline) normal.   Psychiatric:         Judgment: Judgment normal.           Assessment/Plan:   1. Acute respiratory infection  - doxycycline (VIBRAMYCIN) 100 MG Tab; Take 1 Tablet by mouth 2 times a day for 7 days.  Dispense: 14 Tablet; Refill: 0    2. Bronchospasm  - albuterol 108 (90 Base) MCG/ACT Aero Soln inhalation aerosol; Inhale 1-2 Puffs every four hours as needed for Shortness of Breath.  Dispense: 1 Each; Refill: 1    3. Acute cough  - benzonatate (TESSALON) 100 MG Cap; Take 1 " Capsule by mouth 3 times a day as needed for Cough.  Dispense: 30 Capsule; Refill: 0    Medical decision-making/course: The patient remained afebrile, hemodynamically and neurologically stable with no evidence of respiratory compromise throughout the urgent care course.  There was no immediate clinical indication for the necessity of emergency department evaluation or inpatient admission and the patient was amendable to a trial of outpatient management.        In the course of preparing for this visit with review of the pertinent past medical history, recent and past clinic visits, current medications, and performing chart, immunization, medical history and medication reconciliation, and in the further course of obtaining the current history pertinent to the clinic visit today, performing an exam and evaluation, ordering and independently evaluating labs, tests  , and/or procedures, prescribing any recommended new medications as noted above, providing any pertinent counseling and education and recommending further coordination of care including recommendations for symptomatic and supportive measures and recommendations return for any persistent or worsening symptoms, at least  17 minutes of total time were spent during this encounter.      Discussed close monitoring, return precautions, and supportive measures of maintaining adequate fluid hydration and caloric intake, relative rest and symptom management as needed for pain and/or fever.    Differential diagnosis, natural history, supportive care, and indications for immediate follow-up discussed.     Advised the patient to follow-up with the primary care physician for recheck, reevaluation, and consideration of further management.    Please note that this dictation was created using voice recognition software. I have worked with consultants from the vendor as well as technical experts from [facility] [employer] to optimize the interface. I have made every reasonable  attempt to correct obvious errors, but I expect that there are errors of grammar and possibly content that I did not discover before finalizing the note.

## 2025-02-24 NOTE — PATIENT INSTRUCTIONS
Bronchospasm, Adult    Bronchospasm is when the small airways in the lungs narrow. This can make it very hard to breathe. Swelling and more mucus than normal can add to this problem.  What are the causes?  Having a cold.  Exercise.  The smell from sprays, perfumes, candles, and .  Cold air.  Stress or strong feelings such as laughing or crying.  What increases the risk?  Having asthma.  Smoking.  Being around someone who smokes (secondhand smoke).  Having allergies.  Being allergic to certain foods, medicine, or bug bites or stings.  What are the signs or symptoms?  Making a high-pitched whistling sound when you breathe, most often when you breathe out (wheezing).  Coughing.  A tight feeling in your chest.  Feeling like you cannot catch your breath.  Feeling like you have no energy to exercise.  Breathing that is noisy.  A cough that has a high pitch.  How is this treated?    Using medicines that you breathe in (inhale). These open up the airways and help you breathe. Medicines can be taken with a metered dose inhaler or a nebulizer device.  Taking medicines to reduce swelling.  Getting rid of what started the bronchospasm.  Follow these instructions at home:  Medicines  Take over-the-counter and prescription medicines only as told by your doctor.  If you need to use an inhaler or nebulizer to take your medicine, ask your doctor how to use it.  You may be given a spacer to use with your inhaler. This makes it easier to get the medicine from the inhaler into your lungs.  Lifestyle  Do not smoke or use any products that contain nicotine or tobacco. If you need help quitting, ask your doctor.  Keep track of things that start your bronchospasm. Avoid these if you can.  When pollen, air pollution, or humidity is bad, keep windows closed. Use an air conditioner if you have one.  Find ways to cope with stress and your feelings.  Activity  Some people have bronchospasm when they exercise. This is called  exercise-induced bronchoconstriction (EIB). If you have this problem, talk with your doctor about how to deal with EIB. Some tips include:  Use your inhaler before exercise.  Exercise indoors if it is very cold or humid, or if the pollen and mold counts are high.  Warm up and cool down before and after exercise.  Stop your exercise right away if your symptoms start or get worse.  General instructions  If you have asthma, make sure you have an asthma action plan.  Stay up to date on your shots (immunizations).  Keep all follow-up visits.  Get help right away if:  You have trouble breathing.  You wheeze and cough and this does not get better after you take medicine.  You have chest pain.  You have trouble speaking more than one word in a sentence.  These symptoms may be an emergency. Get help right away. Call 911.  Do not wait to see if the symptoms will go away.  Do not drive yourself to the hospital.  Summary  Bronchospasm is when the small airways in the lungs narrow. Swelling and more mucus than normal can add to this problem. This can make it very hard to breathe.  Do not smoke or use any products that contain nicotine or tobacco. If you need help quitting, ask your doctor.  Get help right away if you wheeze and cough and this does not get better after you take medicine.  This information is not intended to replace advice given to you by your health care provider. Make sure you discuss any questions you have with your health care provider.  Document Revised: 07/11/2022 Document Reviewed: 07/11/2022  [employer] Patient Education © 2023 [employer]  Bronchitis  Bronchitis is a problem of the air tubes leading to your lungs. This problem makes it hard for air to get in and out of the lungs. You may cough a lot because your air tubes are narrow. Going without care can cause lasting (chronic) bronchitis.  HOME CARE   Drink enough fluids to keep your pee (urine) clear or pale yellow.  Use a cool mist humidifier.  Quit  smoking if you smoke. If you keep smoking, the bronchitis might not get better.  Only take medicine as told by your doctor.  GET HELP RIGHT AWAY IF:   Coughing keeps you awake.  You start to wheeze.  You become more sick or weak.  You have a hard time breathing or get short of breath.  You cough up blood.  Coughing lasts more than 2 weeks.  You have a fever.  Your baby is older than 3 months with a rectal temperature of 102° F (38.9° C) or higher.  Your baby is 3 months old or younger with a rectal temperature of 100.4° F (38° C) or higher.  MAKE SURE YOU:  Understand these instructions.  Will watch your condition.  Will get help right away if you are not doing well or get worse.  Document Released: 06/05/2009 Document Revised: 03/11/2013 Document Reviewed: 11/19/2010  [employer]® Patient Information ©2014 [employer], [place].

## 2025-03-06 ENCOUNTER — OFFICE VISIT (OUTPATIENT)
Dept: MEDICAL GROUP | Age: 40
End: 2025-03-06
Payer: COMMERCIAL

## 2025-03-06 ENCOUNTER — APPOINTMENT (OUTPATIENT)
Dept: RADIOLOGY | Facility: MEDICAL CENTER | Age: 40
End: 2025-03-06
Attending: EMERGENCY MEDICINE
Payer: COMMERCIAL

## 2025-03-06 ENCOUNTER — HOSPITAL ENCOUNTER (EMERGENCY)
Facility: MEDICAL CENTER | Age: 40
End: 2025-03-06
Attending: EMERGENCY MEDICINE
Payer: COMMERCIAL

## 2025-03-06 VITALS
SYSTOLIC BLOOD PRESSURE: 120 MMHG | BODY MASS INDEX: 33.91 KG/M2 | DIASTOLIC BLOOD PRESSURE: 74 MMHG | OXYGEN SATURATION: 98 % | WEIGHT: 211 LBS | TEMPERATURE: 97 F | HEART RATE: 66 BPM | HEIGHT: 66 IN

## 2025-03-06 VITALS
SYSTOLIC BLOOD PRESSURE: 140 MMHG | TEMPERATURE: 98 F | BODY MASS INDEX: 33.84 KG/M2 | RESPIRATION RATE: 21 BRPM | HEART RATE: 63 BPM | DIASTOLIC BLOOD PRESSURE: 89 MMHG | WEIGHT: 210.54 LBS | OXYGEN SATURATION: 96 % | HEIGHT: 66 IN

## 2025-03-06 DIAGNOSIS — R68.89 FLU-LIKE SYMPTOMS: ICD-10-CM

## 2025-03-06 DIAGNOSIS — J18.9 PNEUMONIA DUE TO INFECTIOUS ORGANISM, UNSPECIFIED LATERALITY, UNSPECIFIED PART OF LUNG: ICD-10-CM

## 2025-03-06 DIAGNOSIS — J40 BRONCHITIS: Primary | ICD-10-CM

## 2025-03-06 DIAGNOSIS — R05.1 ACUTE COUGH: ICD-10-CM

## 2025-03-06 DIAGNOSIS — R53.83 OTHER FATIGUE: ICD-10-CM

## 2025-03-06 LAB
ALBUMIN SERPL BCP-MCNC: 4.4 G/DL (ref 3.2–4.9)
ALBUMIN/GLOB SERPL: 1.3 G/DL
ALP SERPL-CCNC: 79 U/L (ref 30–99)
ALT SERPL-CCNC: 19 U/L (ref 2–50)
ANION GAP SERPL CALC-SCNC: 12 MMOL/L (ref 7–16)
AST SERPL-CCNC: 19 U/L (ref 12–45)
BASOPHILS # BLD AUTO: 1 % (ref 0–1.8)
BASOPHILS # BLD: 0.07 K/UL (ref 0–0.12)
BILIRUB SERPL-MCNC: 1 MG/DL (ref 0.1–1.5)
BUN SERPL-MCNC: 11 MG/DL (ref 8–22)
CALCIUM ALBUM COR SERPL-MCNC: 8.9 MG/DL (ref 8.5–10.5)
CALCIUM SERPL-MCNC: 9.2 MG/DL (ref 8.4–10.2)
CHLORIDE SERPL-SCNC: 103 MMOL/L (ref 96–112)
CO2 SERPL-SCNC: 23 MMOL/L (ref 20–33)
CREAT SERPL-MCNC: 0.71 MG/DL (ref 0.5–1.4)
EKG IMPRESSION: NORMAL
EOSINOPHIL # BLD AUTO: 0.14 K/UL (ref 0–0.51)
EOSINOPHIL NFR BLD: 1.9 % (ref 0–6.9)
ERYTHROCYTE [DISTWIDTH] IN BLOOD BY AUTOMATED COUNT: 41.3 FL (ref 35.9–50)
FLUAV RNA SPEC QL NAA+PROBE: NEGATIVE
FLUBV RNA SPEC QL NAA+PROBE: NEGATIVE
GFR SERPLBLD CREATININE-BSD FMLA CKD-EPI: 110 ML/MIN/1.73 M 2
GLOBULIN SER CALC-MCNC: 3.3 G/DL (ref 1.9–3.5)
GLUCOSE SERPL-MCNC: 84 MG/DL (ref 65–99)
HCT VFR BLD AUTO: 43.2 % (ref 37–47)
HGB BLD-MCNC: 14.4 G/DL (ref 12–16)
IMM GRANULOCYTES # BLD AUTO: 0.03 K/UL (ref 0–0.11)
IMM GRANULOCYTES NFR BLD AUTO: 0.4 % (ref 0–0.9)
LACTATE SERPL-SCNC: 1.3 MMOL/L (ref 0.5–2)
LYMPHOCYTES # BLD AUTO: 1.64 K/UL (ref 1–4.8)
LYMPHOCYTES NFR BLD: 22.7 % (ref 22–41)
MCH RBC QN AUTO: 30.3 PG (ref 27–33)
MCHC RBC AUTO-ENTMCNC: 33.3 G/DL (ref 32.2–35.5)
MCV RBC AUTO: 90.8 FL (ref 81.4–97.8)
MONOCYTES # BLD AUTO: 0.48 K/UL (ref 0–0.85)
MONOCYTES NFR BLD AUTO: 6.6 % (ref 0–13.4)
NEUTROPHILS # BLD AUTO: 4.87 K/UL (ref 1.82–7.42)
NEUTROPHILS NFR BLD: 67.4 % (ref 44–72)
NRBC # BLD AUTO: 0 K/UL
NRBC BLD-RTO: 0 /100 WBC (ref 0–0.2)
PLATELET # BLD AUTO: 401 K/UL (ref 164–446)
PMV BLD AUTO: 10.4 FL (ref 9–12.9)
POTASSIUM SERPL-SCNC: 4 MMOL/L (ref 3.6–5.5)
PROCALCITONIN SERPL-MCNC: <0.02 NG/ML
PROT SERPL-MCNC: 7.7 G/DL (ref 6–8.2)
RBC # BLD AUTO: 4.76 M/UL (ref 4.2–5.4)
RSV RNA SPEC QL NAA+PROBE: NEGATIVE
SARS-COV-2 RNA RESP QL NAA+PROBE: NOTDETECTED
SODIUM SERPL-SCNC: 138 MMOL/L (ref 135–145)
SPECIMEN SOURCE: NORMAL
WBC # BLD AUTO: 7.2 K/UL (ref 4.8–10.8)

## 2025-03-06 PROCEDURE — 3078F DIAST BP <80 MM HG: CPT | Performed by: PHYSICIAN ASSISTANT

## 2025-03-06 PROCEDURE — 80053 COMPREHEN METABOLIC PANEL: CPT

## 2025-03-06 PROCEDURE — 0241U HCHG SARS-COV-2 COVID-19 NFCT DS RESP RNA 4 TRGT MIC: CPT

## 2025-03-06 PROCEDURE — 87040 BLOOD CULTURE FOR BACTERIA: CPT | Mod: 91

## 2025-03-06 PROCEDURE — 83605 ASSAY OF LACTIC ACID: CPT

## 2025-03-06 PROCEDURE — 84145 PROCALCITONIN (PCT): CPT

## 2025-03-06 PROCEDURE — 99284 EMERGENCY DEPT VISIT MOD MDM: CPT

## 2025-03-06 PROCEDURE — 85025 COMPLETE CBC W/AUTO DIFF WBC: CPT

## 2025-03-06 PROCEDURE — 93005 ELECTROCARDIOGRAM TRACING: CPT | Mod: TC | Performed by: EMERGENCY MEDICINE

## 2025-03-06 PROCEDURE — 71045 X-RAY EXAM CHEST 1 VIEW: CPT

## 2025-03-06 PROCEDURE — 36415 COLL VENOUS BLD VENIPUNCTURE: CPT

## 2025-03-06 PROCEDURE — 99215 OFFICE O/P EST HI 40 MIN: CPT | Performed by: PHYSICIAN ASSISTANT

## 2025-03-06 PROCEDURE — 3074F SYST BP LT 130 MM HG: CPT | Performed by: PHYSICIAN ASSISTANT

## 2025-03-06 RX ORDER — DOXYCYCLINE HYCLATE 100 MG
100 TABLET ORAL 2 TIMES DAILY
Status: SHIPPED | COMMUNITY
Start: 2025-02-24 | End: 2025-03-27

## 2025-03-06 RX ORDER — BENZONATATE 100 MG/1
100 CAPSULE ORAL 3 TIMES DAILY PRN
Qty: 15 CAPSULE | Refills: 0 | Status: SHIPPED | OUTPATIENT
Start: 2025-03-06 | End: 2025-03-27

## 2025-03-06 RX ORDER — AMOXICILLIN 500 MG/1
1000 CAPSULE ORAL 3 TIMES DAILY
Status: SHIPPED | COMMUNITY
Start: 2025-02-27 | End: 2025-03-27

## 2025-03-06 RX ORDER — ACETAMINOPHEN 500 MG
500-1000 TABLET ORAL EVERY 6 HOURS PRN
COMMUNITY

## 2025-03-06 ASSESSMENT — PATIENT HEALTH QUESTIONNAIRE - PHQ9: CLINICAL INTERPRETATION OF PHQ2 SCORE: 0

## 2025-03-06 ASSESSMENT — FIBROSIS 4 INDEX
FIB4 SCORE: 0.47
FIB4 SCORE: 0.47

## 2025-03-06 NOTE — ED TRIAGE NOTES
"BIB  for following complaints.     Chief Complaint   Patient presents with    Shortness of Breath     Pt dx with bronchitis last Monday. Dx with pna last Wednesday. Has finished 2 course abx without relief. Seen at MD office today and sent over for further eval. Pt endorses cough, sob, endorses blood in sputum last week now resolved. Pt on xeralto      /78   Pulse 61   Temp 36.7 °C (98 °F) (Temporal)   Resp 16   Ht 1.676 m (5' 6\")   Wt 95.5 kg (210 lb 8.6 oz)   LMP 03/06/2025 (Approximate)   SpO2 98%   Breastfeeding No   BMI 33.98 kg/m²     "

## 2025-03-06 NOTE — ED PROVIDER NOTES
ED Provider Note    Scribed for Jose Muñoz by Jose Muñoz. 3/6/2025  3:01 PM    Primary care provider: TONY Anguiano  Means of arrival: private vehicle   History obtained from: Patient  History limited by: None    CHIEF COMPLAINT  Chief Complaint   Patient presents with    Shortness of Breath     Pt dx with bronchitis last Monday. Dx with pna last Wednesday. Has finished 2 course abx without relief. Seen at MD office today and sent over for further eval. Pt endorses cough, sob, endorses blood in sputum last week now resolved. Pt on xeralto      EXTERNAL RECORDS REVIEWED  Outpatient Notes patient was seen at PCP office and diagnosed with pneumonia    HPI/ROS  LIMITATION TO HISTORY   Select: : None  OUTSIDE HISTORIAN(S):  None    HPI  Luci Mata is a 39 y.o. female who presents to the Emergency Department with no significant past medical or surgical history, presenting today for persistent worsening cough, weakness, fatigue, dizziness.  She got sick about a month ago. Was started on doxycycline, albuterol and symptomatic treatment for presumed URI.  At the end of February she went to [facility] and was apparently diagnosed with pneumonia via x-ray and started on amoxicillin.  She finished the amoxicillin yesterday and has had no improvement whatsoever.  She would back to her primary care team today and they sent her here because they are concerned about her tachypnea and weakness.  She also reports that her pulse was in the upper 40s this morning and she was dizzy.  States her pulse is usually around 60-70.  However she has had the dizziness when her pulses been normal over the last several weeks of being sick.  Denies alcohol drug or tobacco use.    REVIEW OF SYSTEMS  As above, all other systems reviewed and are negative.   See HPI for further details.     PAST MEDICAL HISTORY   has a past medical history of Hemorrhagic disorder (HCC).  SURGICAL HISTORY   has  "a past surgical history that includes njx aa&/strd tfrml epi lumbar/sacral 1 level (Bilateral, 1/25/2022).  SOCIAL HISTORY  Social History     Tobacco Use    Smoking status: Never    Smokeless tobacco: Never   Vaping Use    Vaping status: Never Used   Substance Use Topics    Alcohol use: Not Currently    Drug use: Never      Social History     Substance and Sexual Activity   Drug Use Never     FAMILY HISTORY  History reviewed. No pertinent family history.  CURRENT MEDICATIONS  Home Medications       Reviewed by Ciarra Seth (Pharmacy Tech) on 03/06/25 at 1544  Med List Status: Complete     Medication Last Dose Status   acetaminophen (TYLENOL) 500 MG Tab 3/5/2025 Active   albuterol 108 (90 Base) MCG/ACT Aero Soln inhalation aerosol 3/3/2025 Active   amoxicillin (AMOXIL) 500 MG Cap 3/6/2025 Active   benzonatate (TESSALON) 100 MG Cap 3/6/2025 Active   doxycycline (VIBRAMYCIN) 100 MG Tab 3/3/2025 Active   Paragard Intrauterine Copper IUD Unknown Active   rivaroxaban (XARELTO) 20 MG Tab tablet 3/6/2025 Active                  Audit from Redirected Encounters    **Home medications have not yet been reviewed for this encounter**       ALLERGIES  No Known Allergies    PHYSICAL EXAM    VITAL SIGNS:   Vitals:    03/06/25 1446 03/06/25 1734 03/06/25 1804   BP: 130/78 (!) 140/81 (!) 140/89   Pulse: 61 (!) 59 63   Resp: 16  (!) 21   Temp: 36.7 °C (98 °F)     TempSrc: Temporal     SpO2: 98% 98% 96%   Weight: 95.5 kg (210 lb 8.6 oz)     Height: 1.676 m (5' 6\")       Vitals: My interpretation: normotensive, not tachycardic, afebrile, not hypoxic    Reinterpretation of vitals: unchanged and unremarkable    Cardiac Monitor Interpretation: The cardiac monitor revealed normal Sinus Rhythm as interpreted by me. The cardiac monitor was ordered secondary to the patient's history of bradycardia and to monitor for dysrhythmia and/or tachycardia.    PE:   Gen: sitting comfortably, speaking clearly, appears in no acute distress   ENT: " Mucous membranes moist, posterior pharynx clear, uvula midline, nares patent bilaterally   Neck: Supple, FROM  Pulmonary: Lungs are clear to auscultation bilaterally. No tachypnea  CV:  RRR, no murmur appreciated, pulses 2+ in both upper and lower extremities  Abdomen: soft, NT/ND; no rebound/guarding  : no CVA or suprapubic tenderness   Neuro: A&Ox4 (person, place, time, situation), speech fluent, gait steady, no focal deficits appreciated  Skin: No rash or lesions.  No pallor or jaundice.  No cyanosis.  Warm and dry.     DIAGNOSTIC STUDIES / PROCEDURES    LABS  Results for orders placed or performed during the hospital encounter of 03/06/25   Lactic Acid    Collection Time: 03/06/25  3:41 PM   Result Value Ref Range    Lactic Acid 1.3 0.5 - 2.0 mmol/L   CBC with Differential    Collection Time: 03/06/25  3:41 PM   Result Value Ref Range    WBC 7.2 4.8 - 10.8 K/uL    RBC 4.76 4.20 - 5.40 M/uL    Hemoglobin 14.4 12.0 - 16.0 g/dL    Hematocrit 43.2 37.0 - 47.0 %    MCV 90.8 81.4 - 97.8 fL    MCH 30.3 27.0 - 33.0 pg    MCHC 33.3 32.2 - 35.5 g/dL    RDW 41.3 35.9 - 50.0 fL    Platelet Count 401 164 - 446 K/uL    MPV 10.4 9.0 - 12.9 fL    Neutrophils-Polys 67.40 44.00 - 72.00 %    Lymphocytes 22.70 22.00 - 41.00 %    Monocytes 6.60 0.00 - 13.40 %    Eosinophils 1.90 0.00 - 6.90 %    Basophils 1.00 0.00 - 1.80 %    Immature Granulocytes 0.40 0.00 - 0.90 %    Nucleated RBC 0.00 0.00 - 0.20 /100 WBC    Neutrophils (Absolute) 4.87 1.82 - 7.42 K/uL    Lymphs (Absolute) 1.64 1.00 - 4.80 K/uL    Monos (Absolute) 0.48 0.00 - 0.85 K/uL    Eos (Absolute) 0.14 0.00 - 0.51 K/uL    Baso (Absolute) 0.07 0.00 - 0.12 K/uL    Immature Granulocytes (abs) 0.03 0.00 - 0.11 K/uL    NRBC (Absolute) 0.00 K/uL   Complete Metabolic Panel    Collection Time: 03/06/25  3:41 PM   Result Value Ref Range    Sodium 138 135 - 145 mmol/L    Potassium 4.0 3.6 - 5.5 mmol/L    Chloride 103 96 - 112 mmol/L    Co2 23 20 - 33 mmol/L    Anion Gap 12.0 7.0 -  16.0    Glucose 84 65 - 99 mg/dL    Bun 11 8 - 22 mg/dL    Creatinine 0.71 0.50 - 1.40 mg/dL    Calcium 9.2 8.4 - 10.2 mg/dL    Correct Calcium 8.9 8.5 - 10.5 mg/dL    AST(SGOT) 19 12 - 45 U/L    ALT(SGPT) 19 2 - 50 U/L    Alkaline Phosphatase 79 30 - 99 U/L    Total Bilirubin 1.0 0.1 - 1.5 mg/dL    Albumin 4.4 3.2 - 4.9 g/dL    Total Protein 7.7 6.0 - 8.2 g/dL    Globulin 3.3 1.9 - 3.5 g/dL    A-G Ratio 1.3 g/dL   CoV-2, Flu A/B, And RSV by PCR ([employer])    Collection Time: 25  3:41 PM    Specimen: Respirate   Result Value Ref Range    Influenza virus A RNA Negative Negative    Influenza virus B, PCR Negative Negative    RSV, PCR Negative Negative    SARS-CoV-2 by PCR NotDetected     SARS-CoV-2 Source Nasal Swab    Procalcitonin    Collection Time: 25  3:41 PM   Result Value Ref Range    Procalcitonin <0.02 <0.25 ng/mL   ESTIMATED GFR    Collection Time: 25  3:41 PM   Result Value Ref Range    GFR (CKD-EPI) 110 >60 mL/min/1.73 m 2   EKG (NOW)    Collection Time: 25  4:30 PM   Result Value Ref Range    Report       [facility] Emergency Dept.    Test Date:  2025  Pt Name:    SHAWN MONSALVE               Department: [facility]  MRN:        [id number]                      [facility]  Gender:     Female                       Technician: [id number]  :        [id number]                   Requested By:CASEY JETER  Order #:    [id number]                    Reading MD: Casey Jeter    Measurements  Intervals                                Axis  Rate:       57                           P:          10  GA:         134                          QRS:        -7  QRSD:       100                          T:          3  QT:         431  QTc:        420    Interpretive Statements  Sinus rhythm  No previous ECG available for comparison  Electronically Signed On 2025 16:30:57 PST by Casey Jeter        All labs reviewed by me. Labs were compared to prior labs  if they were available. Significant for lactic acid normal, no leukocytosis, no anemia, normal electrolytes, normal glucose, normal renal function, normal liver enzymes, no bilirubin, flu COVID and RSV negative, procalcitonin negative.    RADIOLOGY  I have independently interpreted the diagnostic imaging associated with this visit and am waiting the final reading from the radiologist.   My preliminary interpretation is a follows: On my independent interpretation patient has no new significant cardiomegaly or focal consolidative process on CXR.     Radiologist interpretation is as follows:  DX-CHEST-PORTABLE (1 VIEW)   Final Result      No evidence of acute cardiopulmonary process.        COURSE & MEDICAL DECISION MAKING  Nursing notes, VS, PMSFHx, labs, imaging, EKG reviewed in chart.    ED Observation Status? No; Patient does not meet criteria for ED Observation.     Ddx: I, flu, COVID, RSV, pneumonia, effusion    MDM: 3:01 PM Luci Mata is a 39 y.o. female who presents to the Emergency Department with no significant past medical or surgical history, presenting today for persistent worsening cough, weakness, fatigue, dizziness.  She got sick about a month ago. Was started on doxycycline, albuterol and symptomatic treatment for presumed URI.  At the end of February she went to [facility] and was apparently diagnosed with pneumonia via x-ray and started on amoxicillin.  She finished the amoxicillin yesterday and has had no improvement whatsoever.  She would back to her primary care team today and they sent her here because they are concerned about her tachypnea and weakness.  She also reports that her pulse was in the upper 40s this morning and she was dizzy.  States her pulse is usually around 60-70.  However she has had the dizziness when her pulses been normal over the last several weeks of being sick.  Denies alcohol drug or tobacco use.  On arrival here she is a benign physical exam  other than a dry nonproductive cough consistent with bronchitis.  Lungs are clear.  Abdomen soft.  Does not appear acutely ill.  Will undergo sepsis workup, repeat chest x-ray, EKG for bradycardia and reevaluation.  The patient's chest x-ray on my independent interpretation showed no full consolidation or cardiomegaly, radiologist agrees. All labs reviewed by me. Labs were compared to prior labs if they were available. Significant for lactic acid normal, no leukocytosis, no anemia, normal electrolytes, normal glucose, normal renal function, normal liver enzymes, no bilirubin, flu COVID and RSV negative, procalcitonin negative.  Overall patient has a very reassuring and normal workup here in the ED.  No signs of pneumonia or need for recurrent antibiotics.  I think she likely is suffering from a prolonged bronchitis after viral illness, URI.  At this time we will plan to treat with conservative measures, have her follow-up with her PCP as needed.  And return precautions were discussed with the patient.  She verbalized understanding and is amenable.    ADDITIONAL PROBLEM LIST AND DISPOSITION    I have discussed management of the patient with the following physicians and KUSUM's: None    Discussion of management with other QHP or appropriate source(s): None     Escalation of care considered, and ultimately not performed:acute inpatient care management, however at this time, the patient is most appropriate for outpatient management    Barriers to care at this time, including but not limited to:  None .     Decision tools and prescription drugs considered including, but not limited to:  Tessalon Perles .    FINAL IMPRESSION  1. Bronchitis    2. Acute cough Acute   3. Other fatigue Acute   4. Flu-like symptoms Acute      The note accurately reflects work and decisions made by me.  Jose Muñoz  3/6/2025  3:01 PM

## 2025-03-06 NOTE — PROGRESS NOTES
"cc: Worsening of pneumonia    Subjective:     HPI    This is a 39-year-old female presenting with concerns of worsening pneumonia.  Amenable to see her PCP today.    She has now been ill for approximately 2 weeks.  Was seen in urgent care 2/24 diagnosed with upper respiratory infection given doxycycline, butyryl inhaler, Tessalon Perles.  Symptoms progressively worsened and subsequently went to the 2/27, do not have records was at [facility].  Per patient did chest x-ray diagnosed with pneumonia.  Was started on amoxicillin.  Only noted slight improvement in symptoms.  For the past week she is continue to have cough, fatigue, chills, not noted to have a fever.  Feels very weak, sleeping excessively, does feel short of breath.  She is not able to tolerate the albuterol inhaler, makes her vomit.  She is here with her .    Review of systems:  See above.       Current Outpatient Medications:     albuterol 108 (90 Base) MCG/ACT Aero Soln inhalation aerosol, Inhale 1-2 Puffs every four hours as needed for Shortness of Breath., Disp: 1 Each, Rfl: 1    benzonatate (TESSALON) 100 MG Cap, Take 1 Capsule by mouth 3 times a day as needed for Cough., Disp: 30 Capsule, Rfl: 0    rivaroxaban (XARELTO) 20 MG Tab tablet, Take 1 Tablet by mouth with dinner., Disp: 90 Tablet, Rfl: 1    Paragard Intrauterine Copper IUD, by Intrauterine route., Disp: , Rfl:     Allergies, past medical history, past surgical history, family history, social history reviewed and updated    Objective:     Vitals: /74 (BP Location: Left arm, Patient Position: Sitting, BP Cuff Size: Large adult)   Pulse 66   Temp 36.1 °C (97 °F) (Temporal)   Ht 1.676 m (5' 6\")   Wt 95.7 kg (211 lb)   SpO2 98%   BMI 34.06 kg/m²   General: Alert, pleasant, ill, nontoxic appearing,  increased work of breathing  HEENT: Normocephalic. Neck supple.  No thyromegaly or masses palpated. No cervical or supraclavicular lymphadenopathy. No carotid bruits   Heart: " Regular rate and rhythm.  S1 and S2 normal.  No murmurs appreciated.  Respiratory: Increased work of breathing, tachypneic.  Wheezing bilateral lower lobes trace rhonchi.   Skin: Warm, dry, no rashes.  Psych:  Affect/mood is normal, judgement is good, memory is intact, grooming is appropriate.    Assessment/Plan:     Luci was seen today for pneumonia.    Diagnoses and all orders for this visit:    Pneumonia due to infectious organism, unspecified laterality, unspecified part of lung    She finished course of antibiotics, amoxicillin yesterday.  In clinic she is tachypneic, does look acutely ill, has increased work of breathing.  At this point in time feel she would be best evaluated in the emergency room with higher level of care as she has failed outpatient antibiotics.  Patient and  are agreeable, state they will go to the ER now.      Return if symptoms worsen or fail to improve.

## 2025-03-06 NOTE — ED NOTES
Med Rec completed per patient   Allergies reviewed    Patient completed a 7 day course of Amoxicillin this morning (3/6/2025)   Patient completed a 7 day course of Doxycyline on 3/3/2025    Patient takes Xarelto 20 mg daily   Last dose 3/6/2025 AM     Dispense history available in EPIC? No

## 2025-03-07 NOTE — DISCHARGE INSTRUCTIONS
Thankfully your chest x-ray, lab work, respiratory panel are all negative.  This is reassuring.  I think unfortunately you have a prolonged episode of bronchitis.  You will need to follow-up with your primary care team for further evaluation and treatment.  You can take the Tessalon Perles to help with symptoms.  If you have worsening symptoms, chest pain, shortness of breath or concerns, please return to the ED.  Thank you for coming in today.

## 2025-03-07 NOTE — ED NOTES
Patient given discharge instructions, verbalized understanding. PIV discontinued. Rx given for tessalon. Patient instructed to follow up with PCP. Education provided to come to ER if symptoms worsen. Discharged in stable condition, able to walk out with steady gait.

## 2025-03-11 LAB
BACTERIA BLD CULT: NORMAL
BACTERIA BLD CULT: NORMAL
SIGNIFICANT IND 70042: NORMAL
SIGNIFICANT IND 70042: NORMAL
SITE SITE: NORMAL
SITE SITE: NORMAL
SOURCE SOURCE: NORMAL
SOURCE SOURCE: NORMAL

## 2025-03-13 ENCOUNTER — RESULTS FOLLOW-UP (OUTPATIENT)
Dept: MEDICAL GROUP | Facility: IMAGING CENTER | Age: 40
End: 2025-03-13
Payer: COMMERCIAL

## 2025-03-25 DIAGNOSIS — Z79.01 CHRONIC ANTICOAGULATION: ICD-10-CM

## 2025-03-27 ENCOUNTER — ANTICOAGULATION VISIT (OUTPATIENT)
Dept: VASCULAR LAB | Facility: MEDICAL CENTER | Age: 40
End: 2025-03-27
Attending: INTERNAL MEDICINE
Payer: COMMERCIAL

## 2025-03-27 VITALS — DIASTOLIC BLOOD PRESSURE: 80 MMHG | SYSTOLIC BLOOD PRESSURE: 120 MMHG | HEART RATE: 63 BPM

## 2025-03-27 DIAGNOSIS — D68.51 HOMOZYGOUS FACTOR V LEIDEN MUTATION (HCC): ICD-10-CM

## 2025-03-27 DIAGNOSIS — Z79.01 CHRONIC ANTICOAGULATION: ICD-10-CM

## 2025-03-27 DIAGNOSIS — Z86.718 HX OF DEEP VENOUS THROMBOSIS: ICD-10-CM

## 2025-03-27 PROCEDURE — 99212 OFFICE O/P EST SF 10 MIN: CPT

## 2025-03-27 NOTE — PROGRESS NOTES
Referral Date: 3/27/25 - new referral placed today    Target end date: Indefinite  Indication: Recurrent DVT w/ FVL  Drug: Xarelto 20 mg daily  CHADsVASC = N/A    Health Status Since Last Assessment  Pt denies any new relevant medical problems, ED visits or hospitalizations  Pt was hospitalized recently (3/6/25) for Bronchitis  Pt denies any embolic events (stroke/tia/systemic embolism)    Adherence with DOAC Therapy  Pt has not missed doses in the average week.  DOAC is affordable    Bleeding Risk Assessment  Pt denies epistaxis  Pt denies any hematuria  Pt denies any excessive or unusual bleeding/hematomas.  Pt denies any GI bleeds or hematemesis.  Pt denies any concerning daily headache or subdural hematoma symptoms.    Latest Hemoglobin: WNL  Hemoglobin   Date Value Ref Range Status   03/06/2025 14.4 12.0 - 16.0 g/dL Final     Latest Platelets: WNL  Platelet Count   Date Value Ref Range Status   03/06/2025 401 164 - 446 K/uL Final          Creatinine Clearance/Renal Function  Latest CrCl: >100 ml/min    Hepatic function  Latest LFTs: WNL  Pt denies any history of liver dysfunction   Pt denies  ETOH overuse     Drug Interactions  ASA/other antiplatelets: None  NSAID: None  Other drug interactions: None  Verified no anticonvulsant or azole therapy, education provided for future use.     Examination  Blood Pressure WNL  There were no vitals filed for this visit.  Symptomatic hypotension: Denies   Significant gait impairment/imbalance/high risk for falls? No    Final Assessment and Recommendations:  Patient appears stable from the anticoagulation standpoint.    Benefits of continued DOAC therapy outweigh risks for this patient  Recommend pt continue with current DOAC therapy: Xarelto 20 mg daily       Other Actions: CMP/CBC hemogram ordered prior to next visit (MCM reminder scheduled to be sent on 5/27/25 to remind patient to obtain labs).     Follow up:  Will follow up with patient 3 month(s).     Christopher Bojorquez,  PharmD

## 2025-06-30 ENCOUNTER — TELEPHONE (OUTPATIENT)
Dept: HEALTH INFORMATION MANAGEMENT | Facility: OTHER | Age: 40
End: 2025-06-30
Payer: COMMERCIAL

## 2025-07-02 ENCOUNTER — APPOINTMENT (OUTPATIENT)
Dept: VASCULAR LAB | Facility: MEDICAL CENTER | Age: 40
End: 2025-07-02
Attending: INTERNAL MEDICINE
Payer: COMMERCIAL

## 2025-07-20 ENCOUNTER — OFFICE VISIT (OUTPATIENT)
Dept: URGENT CARE | Facility: CLINIC | Age: 40
End: 2025-07-20
Payer: COMMERCIAL

## 2025-07-20 ENCOUNTER — HOSPITAL ENCOUNTER (OUTPATIENT)
Facility: MEDICAL CENTER | Age: 40
End: 2025-07-20
Attending: NURSE PRACTITIONER
Payer: COMMERCIAL

## 2025-07-20 VITALS
SYSTOLIC BLOOD PRESSURE: 124 MMHG | OXYGEN SATURATION: 96 % | HEIGHT: 66 IN | HEART RATE: 67 BPM | RESPIRATION RATE: 16 BRPM | WEIGHT: 210 LBS | TEMPERATURE: 97.4 F | BODY MASS INDEX: 33.75 KG/M2 | DIASTOLIC BLOOD PRESSURE: 72 MMHG

## 2025-07-20 DIAGNOSIS — Z72.51 HIGH RISK SEXUAL BEHAVIOR, UNSPECIFIED TYPE: Primary | ICD-10-CM

## 2025-07-20 DIAGNOSIS — M79.89 LEFT AXILLARY SWELLING: ICD-10-CM

## 2025-07-20 DIAGNOSIS — Z72.51 HIGH RISK SEXUAL BEHAVIOR, UNSPECIFIED TYPE: ICD-10-CM

## 2025-07-20 PROCEDURE — 87491 CHLMYD TRACH DNA AMP PROBE: CPT

## 2025-07-20 PROCEDURE — 87591 N.GONORRHOEAE DNA AMP PROB: CPT

## 2025-07-20 PROCEDURE — 99214 OFFICE O/P EST MOD 30 MIN: CPT | Performed by: NURSE PRACTITIONER

## 2025-07-20 ASSESSMENT — FIBROSIS 4 INDEX: FIB4 SCORE: 0.43

## 2025-07-20 NOTE — PROGRESS NOTES
Chief Complaint   Patient presents with    Arm Pain     Armpit lump pain also new partner so wants STI test          History of Present Illness  The patient is a 40-year-old female who presents for evaluation of a lump in her left arm and for STD testing.    She has never undergone testing for sexually transmitted infections or diseases. Recently, she and her  have decided to open their marriage to other partners, prompting her to seek testing. She reports no symptoms such as vaginal itching, irritation, or burning. She uses an intrauterine device (IUD) for contraception, which she plans to replace soon as it has been in place for 10 years. She declines a urine pregnancy test today, noting that she recently had her menstrual period.    A few days ago, she experienced pain in her entire shoulder, describing her left arm as sore and heavy. She has been more active recently, including gym workouts. This morning, while lying down, she noticed a lump in her left arm when she lifted it. The lump is not painful to touch but feels sore. She reports no fevers. The pain radiates down her arm to her elbow, depending on her position. She reports no chest wall pain, breast lumps or lesions, nipple discharge, or rashes. She suspects the lump may be due to a muscle injury from her gym activities, although she did not feel any specific discomfort during her workout yesterday morning. She reports no new neck pain.    Social History:  Marital Status:   Sexual Practices: Open marriage    GYNECOLOGICAL HISTORY:  Last Menstrual Period: Recently         ROS:    No severe shortness of breath   No Cardiac like chest pain, as discussed   As otherwise stated in HPI    Medical/SX/ Social History:  Reviewed per chart    Pertinent Medications:    Medications Ordered Prior to Encounter[1]     Allergies:    Patient has no known allergies.     Problem list, medications, and allergies reviewed by myself today in [facility]     Physical  Exam:    Vitals:    07/20/25 1206   BP: 124/72   Pulse: 67   Resp: 16   Temp: 36.3 °C (97.4 °F)   SpO2: 96%             Physical Exam  Constitutional:       Appearance: Normal appearance.   HENT:      Head: Normocephalic and atraumatic.   Chest:   Breasts:     Right: Normal.      Left: Normal.       Abdominal:      General: Abdomen is flat. Bowel sounds are normal.      Palpations: Abdomen is soft.      Tenderness: There is no abdominal tenderness. There is no right CVA tenderness or left CVA tenderness.   Neurological:      Mental Status: She is alert.            Diagnostics:    Diagnostic mammogram with ultrasound pending       - Chlamydia/GC, PCR (Genital/Anal swab); Future  - HIV AG/AB Combo Assay Screening; Future  - T.Pallidum AB NATALY (Screening); Future  - Hepatitis C Virus Antibody; Future  - HEP B Surface Antibody; Future  - Hep B Core AB Total; Future  - Hep B Surface Antigen; Future  - Trichomonas Vaginalis by TMA        Medical Decision making and plan :  I personally reviewed prior external notes and test results pertinent to today's visit. Pt is clinically stable at today's acute urgent care visit.  Patient appears nontoxic with no acute distress noted. Appropriate for outpatient care at this time.    Pleasant 40 y.o. female presented clinic with:     Assessment & Plan  1. High-risk sexual behavior.  No prior STI/STD testing; seeking testing due to open marriage.  Comprehensive panel ordered: gonorrhea, chlamydia (swab), HIV, syphilis, trichomonas, hepatitis C, hepatitis B (lab tests). Informed that elevated hepatitis B result may be due to previous vaccination; update vaccine if not elevated. Treatment will be initiated if any tests return positive.    2. Lump in the left arm.  Reports soreness and heaviness in the left arm, with pain radiating down the arm. Physical exam reveals tenderness and denseness, likely muscular; no lymph nodes felt. Ultrasound ordered to investigate further; possibility of  inflamed lymph node or muscle injury. No associated fever, chest wall pain, or breast lumps/lesions.      Shared decision-making was utilized with patient for treatment plan. Medication discussed included indication for use and the potential benefits and side effects. Education was provided regarding the aforementioned assessments.  Differential Diagnosis, natural history, and supportive care discussed. All of the patient's questions were answered to their satisfaction at the time of discharge. Patient was encouraged to monitor symptoms closely. Those signs and symptoms which would warrant concern and mandate seeking a higher level of service through the emergency department discussed at length.  Patient stated agreement and understanding of this plan of care.    Disposition:  Home in stable condition     Voice Recognition Disclaimer:  Portions of this document were created using voice recognition software and [employer] technology provided by [employer].  Patient was informed of [email or web address] technology being used.  The software does have a chance of producing errors of grammar and possibly content. I have made every reasonable attempt to correct obvious errors, but there could be errors of grammar and possibly content that I did not discover before finalizing the documentation.    Lotus Kenney, A.P.R.N.        [1]   Current Outpatient Medications on File Prior to Visit   Medication Sig Dispense Refill    acetaminophen (TYLENOL) 500 MG Tab Take 500-1,000 mg by mouth every 6 hours as needed. Indications: Pain      albuterol 108 (90 Base) MCG/ACT Aero Soln inhalation aerosol Inhale 1-2 Puffs every four hours as needed for Shortness of Breath. 1 Each 1    rivaroxaban (XARELTO) 20 MG Tab tablet Take 1 Tablet by mouth with dinner. 90 Tablet 1    Paragard Intrauterine Copper IUD 1 Application by Intrauterine route one time. In place       No current facility-administered medications on file prior to visit.

## 2025-07-21 ENCOUNTER — HOSPITAL ENCOUNTER (OUTPATIENT)
Dept: LAB | Facility: MEDICAL CENTER | Age: 40
End: 2025-07-21
Attending: NURSE PRACTITIONER
Payer: COMMERCIAL

## 2025-07-21 DIAGNOSIS — Z72.51 HIGH RISK SEXUAL BEHAVIOR, UNSPECIFIED TYPE: ICD-10-CM

## 2025-07-21 LAB
HBV CORE AB SERPL QL IA: NONREACTIVE
HBV SURFACE AB SERPL IA-ACNC: 208 MIU/ML (ref 0–10)
HBV SURFACE AG SER QL: NORMAL
HCV AB SER QL: NORMAL
HIV 1+2 AB+HIV1 P24 AG SERPL QL IA: NORMAL
T PALLIDUM AB SER QL IA: NORMAL

## 2025-07-21 PROCEDURE — 86780 TREPONEMA PALLIDUM: CPT

## 2025-07-21 PROCEDURE — 87389 HIV-1 AG W/HIV-1&-2 AB AG IA: CPT

## 2025-07-21 PROCEDURE — 86706 HEP B SURFACE ANTIBODY: CPT

## 2025-07-21 PROCEDURE — 36415 COLL VENOUS BLD VENIPUNCTURE: CPT

## 2025-07-21 PROCEDURE — 86803 HEPATITIS C AB TEST: CPT

## 2025-07-21 PROCEDURE — 86704 HEP B CORE ANTIBODY TOTAL: CPT

## 2025-07-21 PROCEDURE — 87340 HEPATITIS B SURFACE AG IA: CPT

## 2025-07-22 ENCOUNTER — HOSPITAL ENCOUNTER (OUTPATIENT)
Facility: MEDICAL CENTER | Age: 40
End: 2025-07-22
Attending: NURSE PRACTITIONER
Payer: COMMERCIAL

## 2025-07-22 DIAGNOSIS — M79.89 LEFT AXILLARY SWELLING: ICD-10-CM

## 2025-07-22 PROCEDURE — 76642 ULTRASOUND BREAST LIMITED: CPT | Mod: LT

## 2025-07-22 PROCEDURE — G0279 TOMOSYNTHESIS, MAMMO: HCPCS

## 2025-07-23 LAB
C TRACH DNA SPEC QL NAA+PROBE: NEGATIVE
N GONORRHOEA DNA SPEC QL NAA+PROBE: NEGATIVE
SPEC CONTAINER SPEC: NORMAL
SPECIMEN SOURCE: NORMAL

## 2025-08-04 ENCOUNTER — TELEPHONE (OUTPATIENT)
Dept: OBGYN | Facility: CLINIC | Age: 40
End: 2025-08-04
Payer: COMMERCIAL

## 2025-08-20 DIAGNOSIS — Z79.01 CHRONIC ANTICOAGULATION: ICD-10-CM

## 2025-08-26 ENCOUNTER — OFFICE VISIT (OUTPATIENT)
Dept: OBGYN | Facility: CLINIC | Age: 40
End: 2025-08-26
Payer: COMMERCIAL

## 2025-08-26 ENCOUNTER — HOSPITAL ENCOUNTER (OUTPATIENT)
Facility: MEDICAL CENTER | Age: 40
End: 2025-08-26
Attending: STUDENT IN AN ORGANIZED HEALTH CARE EDUCATION/TRAINING PROGRAM
Payer: COMMERCIAL

## 2025-08-26 VITALS
WEIGHT: 207.8 LBS | DIASTOLIC BLOOD PRESSURE: 79 MMHG | SYSTOLIC BLOOD PRESSURE: 124 MMHG | BODY MASS INDEX: 33.4 KG/M2 | HEIGHT: 66 IN | HEART RATE: 65 BPM

## 2025-08-26 DIAGNOSIS — Z01.419 WELL WOMAN EXAM WITH ROUTINE GYNECOLOGICAL EXAM: Primary | ICD-10-CM

## 2025-08-26 DIAGNOSIS — Z12.4 SCREENING FOR MALIGNANT NEOPLASM OF CERVIX: ICD-10-CM

## 2025-08-26 DIAGNOSIS — Z01.419 WELL WOMAN EXAM WITH ROUTINE GYNECOLOGICAL EXAM: ICD-10-CM

## 2025-08-26 PROCEDURE — 87624 HPV HI-RISK TYP POOLED RSLT: CPT

## 2025-08-26 PROCEDURE — 88175 CYTOPATH C/V AUTO FLUID REDO: CPT

## 2025-08-26 ASSESSMENT — LIFESTYLE VARIABLES
HOW OFTEN DO YOU HAVE SIX OR MORE DRINKS ON ONE OCCASION: NEVER
SKIP TO QUESTIONS 9-10: 1
AUDIT-C TOTAL SCORE: 2
HOW OFTEN DO YOU HAVE A DRINK CONTAINING ALCOHOL: 2-4 TIMES A MONTH
HOW MANY STANDARD DRINKS CONTAINING ALCOHOL DO YOU HAVE ON A TYPICAL DAY: 1 OR 2

## 2025-08-26 ASSESSMENT — FIBROSIS 4 INDEX: FIB4 SCORE: 0.43

## 2025-08-29 LAB
HPV I/H RISK 1 DNA SPEC QL NAA+PROBE: NOT DETECTED
SPECIMEN SOURCE: NORMAL
THINPREP PAP, CYTOLOGY NL11781: NORMAL